# Patient Record
Sex: FEMALE | Race: WHITE | Employment: FULL TIME | URBAN - NONMETROPOLITAN AREA
[De-identification: names, ages, dates, MRNs, and addresses within clinical notes are randomized per-mention and may not be internally consistent; named-entity substitution may affect disease eponyms.]

---
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
## 2020-05-01 ENCOUNTER — HOSPITAL ENCOUNTER (EMERGENCY)
Age: 20
Discharge: HOME | End: 2020-05-01
Payer: COMMERCIAL

## 2020-05-01 VITALS
HEIGHT: 62 IN | BODY MASS INDEX: 28.52 KG/M2 | SYSTOLIC BLOOD PRESSURE: 91 MMHG | RESPIRATION RATE: 18 BRPM | TEMPERATURE: 98.6 F | DIASTOLIC BLOOD PRESSURE: 72 MMHG | OXYGEN SATURATION: 97 % | WEIGHT: 155 LBS | HEART RATE: 90 BPM

## 2020-05-01 PROCEDURE — 99284 EMERGENCY DEPT VISIT MOD MDM: CPT

## 2020-05-01 RX ORDER — ACETAMINOPHEN 325 MG/1
650 TABLET ORAL EVERY 6 HOURS PRN
Qty: 30 TABLET | Refills: 0 | Status: SHIPPED | OUTPATIENT
Start: 2020-05-01 | End: 2020-06-11 | Stop reason: ALTCHOICE

## 2020-05-01 RX ORDER — TIZANIDINE 4 MG/1
4 TABLET ORAL EVERY 8 HOURS PRN
Qty: 18 TABLET | Refills: 0 | Status: SHIPPED | OUTPATIENT
Start: 2020-05-01 | End: 2020-06-11 | Stop reason: ALTCHOICE

## 2020-05-01 ASSESSMENT — PAIN SCALES - GENERAL: PAINLEVEL_OUTOF10: 3
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
## 2020-05-15 ENCOUNTER — TELEPHONE (OUTPATIENT)
Dept: SURGERY | Age: 20
End: 2020-05-15

## 2020-06-11 ENCOUNTER — HOSPITAL ENCOUNTER (EMERGENCY)
Age: 20
Discharge: HOME | End: 2020-06-11
Payer: COMMERCIAL

## 2020-06-11 VITALS
HEART RATE: 98 BPM | BODY MASS INDEX: 28.52 KG/M2 | TEMPERATURE: 97.6 F | OXYGEN SATURATION: 97 % | SYSTOLIC BLOOD PRESSURE: 126 MMHG | DIASTOLIC BLOOD PRESSURE: 78 MMHG | HEIGHT: 62 IN | RESPIRATION RATE: 18 BRPM | WEIGHT: 155 LBS

## 2020-06-11 LAB
BILIRUBIN URINE: NEGATIVE
BLOOD, URINE: ABNORMAL
CHARACTER, URINE: CLEAR
COLOR: YELLOW
GLUCOSE URINE: NEGATIVE MG/DL
KETONES, URINE: NEGATIVE
LEUKOCYTE ESTERASE, URINE: ABNORMAL
NITRITE, URINE: NEGATIVE
PH UA: 6 (ref 5–9)
PROTEIN UA: 30 MG/DL
SPECIFIC GRAVITY UA: 1.02 (ref 1–1.03)
UROBILINOGEN, URINE: 0.2 EU/DL (ref 0.2–1)

## 2020-06-11 PROCEDURE — 6370000000 HC RX 637 (ALT 250 FOR IP): Performed by: NURSE PRACTITIONER

## 2020-06-11 PROCEDURE — 2500000003 HC RX 250 WO HCPCS: Performed by: NURSE PRACTITIONER

## 2020-06-11 PROCEDURE — 6360000002 HC RX W HCPCS: Performed by: NURSE PRACTITIONER

## 2020-06-11 PROCEDURE — 87491 CHLMYD TRACH DNA AMP PROBE: CPT

## 2020-06-11 PROCEDURE — 99213 OFFICE O/P EST LOW 20 MIN: CPT | Performed by: NURSE PRACTITIONER

## 2020-06-11 PROCEDURE — 87591 N.GONORRHOEAE DNA AMP PROB: CPT

## 2020-06-11 PROCEDURE — 99213 OFFICE O/P EST LOW 20 MIN: CPT

## 2020-06-11 PROCEDURE — 81003 URINALYSIS AUTO W/O SCOPE: CPT

## 2020-06-11 PROCEDURE — 96372 THER/PROPH/DIAG INJ SC/IM: CPT

## 2020-06-11 RX ORDER — AZITHROMYCIN 250 MG/1
1000 TABLET, FILM COATED ORAL ONCE
Status: COMPLETED | OUTPATIENT
Start: 2020-06-11 | End: 2020-06-11

## 2020-06-11 RX ADMIN — LIDOCAINE HYDROCHLORIDE 250 MG: 10 INJECTION, SOLUTION EPIDURAL; INFILTRATION; INTRACAUDAL; PERINEURAL at 08:52

## 2020-06-11 RX ADMIN — AZITHROMYCIN 1000 MG: 250 TABLET, FILM COATED ORAL at 08:55

## 2020-06-11 ASSESSMENT — ENCOUNTER SYMPTOMS
ABDOMINAL PAIN: 0
VOMITING: 0
NAUSEA: 0
SHORTNESS OF BREATH: 0

## 2020-06-11 NOTE — ED PROVIDER NOTES
[facility]  Urgent Care Encounter       CHIEF COMPLAINT       Chief Complaint   Patient presents with    Exposure to STD       Nurses Notes reviewed and I agree except as noted in the HPI. HISTORY OF PRESENT ILLNESS   Belia Ariza is a 23 y.o. female who presents for evaluation after exposure to chlamydia. Patient states that she has been sexually active over the past month with her new boyfriend. States that her boyfriend received a text roughly 1 week ago stating that his previous sexual partner has tested positive for chlamydia. Patient states that her boyfriend was tested and was positive for chlamydia. States that he was treated 2 days ago. Patient denies any symptoms at this time but is concerned that she has been exposed without protection. The history is provided by the patient. REVIEW OF SYSTEMS     Review of Systems   Constitutional: Negative for chills and fever. Respiratory: Negative for shortness of breath. Cardiovascular: Negative for chest pain. Gastrointestinal: Negative for abdominal pain, nausea and vomiting. Genitourinary: Negative for dysuria and vaginal discharge. Musculoskeletal: Negative for arthralgias and myalgias. Skin: Negative for rash. Neurological: Negative for headaches. PAST MEDICAL HISTORY         Diagnosis Date    Crohn's colitis ([facility] [address] 2016    Environmental allergies     Pilonidal cyst        SURGICALHISTORY     Patient  has a past surgical history that includes other surgical history (12/05/2017); Colonoscopy; and Pilonidal cyst excision (N/A, 12/5/2019).     CURRENT MEDICATIONS       Previous Medications    IBUPROFEN (ADVIL;MOTRIN) 600 MG TABLET    Take 600 mg by mouth every 6 hours as needed for Pain    INFLIXIMAB (REMICADE) 100 MG INJECTION    Infuse 5 mg/kg intravenously See Admin Instructions Every 3 months    LORATADINE-PSEUDOEPHEDRINE (CLARITIN-D 24HR)  MG PER EXTENDED RELEASE TABLET    Take 1 tablet Findings: No rash. Neurological:      Mental Status: She is alert and oriented to person, place, and time. Psychiatric:         Behavior: Behavior normal.         DIAGNOSTIC RESULTS     Labs:  Results for orders placed or performed during the hospital encounter of 06/11/20   Urinalysis   Result Value Ref Range    Glucose, Ur Negative NEGATIVE mg/dl    Bilirubin Urine Negative NEGATIVE    Ketones, Urine Negative NEGATIVE    Specific Gravity, UA 1.025 1.002 - 1.03    Blood, Urine Trace-intact NEGATIVE    pH, UA 6.00 5.0 - 9.0    Protein, UA 30 (A) NEGATIVE mg/dl    Urobilinogen, Urine 0.20 0.2 - 1.0 eu/dl    Nitrite, Urine Negative NEGATIVE    Leukocyte Esterase, Urine Small (A) NEGATIVE    Color, UA Yellow STRAW-YELL    Character, Urine Clear CLEAR-SL C       IMAGING:    No orders to display         EKG:  none    URGENT CARE COURSE:     Vitals:    06/11/20 0829   BP: 133/84   Pulse: 95   Resp: 20   Temp: 97.6 °F (36.4 °C)   TempSrc: Temporal   SpO2: 98%   Weight: 155 lb (70.3 kg)   Height: 5' 2\" (1.575 m)       Medications   cefTRIAXone (ROCEPHIN) 250 mg in lidocaine 1 % 1 mL IM Injection (has no administration in time range)   azithromycin (ZITHROMAX) tablet 1,000 mg (has no administration in time range)            PROCEDURES:  None    FINAL IMPRESSION      1. Exposure to STD          DISPOSITION/ PLAN       Discussed with the patient the plan to test for chlamydia and gonorrhea, however she will be treated at this time. Patient is advised to remain free of any sexual activity until notified of her results and that she should refrain from any sexual activity with her boyfriend until he has had a test of cure that shows he is negative. She is advised to follow-up on an outpatient basis as needed and is agreeable to plan as discussed.     PATIENT REFERRED TO:  DO Talita Charles [address] / [address] [address]      DISCHARGE MEDICATIONS:  New Prescriptions    No medications on file       Discontinued Medications    ACETAMINOPHEN (AMINOFEN) 325 MG TABLET    Take 2 tablets by mouth every 6 hours as needed for Pain    TIZANIDINE (ZANAFLEX) 4 MG TABLET    Take 1 tablet by mouth every 8 hours as needed (muscle spasm)       Current Discharge Medication List          LUANN Rapp CNP    (Please note that portions of this note were completed with a voice recognition program. Efforts were made to edit the dictations but occasionally words are mis-transcribed.)          LUANN Rapp CNP  06/11/20 2024

## 2020-06-12 LAB
CHLAMYDIA TRACHOMATIS BY RT-PCR: NOT DETECTED
CT/NG SOURCE: NORMAL
NEISSERIA GONORRHOEAE BY RT-PCR: NOT DETECTED

## 2020-08-26 ENCOUNTER — OFFICE VISIT (OUTPATIENT)
Dept: SURGERY | Age: 20
End: 2020-08-26
Payer: COMMERCIAL

## 2020-08-26 ENCOUNTER — HOSPITAL ENCOUNTER (EMERGENCY)
Age: 20
Discharge: HOME | End: 2020-08-26
Payer: COMMERCIAL

## 2020-08-26 VITALS
DIASTOLIC BLOOD PRESSURE: 74 MMHG | OXYGEN SATURATION: 98 % | HEIGHT: 62 IN | HEART RATE: 142 BPM | BODY MASS INDEX: 30.36 KG/M2 | SYSTOLIC BLOOD PRESSURE: 116 MMHG | WEIGHT: 165 LBS | RESPIRATION RATE: 16 BRPM | TEMPERATURE: 98.2 F

## 2020-08-26 VITALS
BODY MASS INDEX: 29.44 KG/M2 | HEART RATE: 125 BPM | RESPIRATION RATE: 16 BRPM | DIASTOLIC BLOOD PRESSURE: 78 MMHG | WEIGHT: 160 LBS | TEMPERATURE: 99.3 F | OXYGEN SATURATION: 99 % | SYSTOLIC BLOOD PRESSURE: 125 MMHG | HEIGHT: 62 IN

## 2020-08-26 LAB
GROUP A STREP CULTURE, REFLEX: NEGATIVE
REFLEX THROAT C + S: NORMAL

## 2020-08-26 PROCEDURE — 99213 OFFICE O/P EST LOW 20 MIN: CPT

## 2020-08-26 PROCEDURE — 87880 STREP A ASSAY W/OPTIC: CPT

## 2020-08-26 PROCEDURE — 99213 OFFICE O/P EST LOW 20 MIN: CPT | Performed by: SURGERY

## 2020-08-26 PROCEDURE — 87070 CULTURE OTHR SPECIMN AEROBIC: CPT

## 2020-08-26 PROCEDURE — 99213 OFFICE O/P EST LOW 20 MIN: CPT | Performed by: NURSE PRACTITIONER

## 2020-08-26 RX ORDER — SULFAMETHOXAZOLE AND TRIMETHOPRIM 800; 160 MG/1; MG/1
1 TABLET ORAL 2 TIMES DAILY
Qty: 14 TABLET | Refills: 2 | Status: SHIPPED | OUTPATIENT
Start: 2020-08-26 | End: 2020-09-02

## 2020-08-26 ASSESSMENT — ENCOUNTER SYMPTOMS
SORE THROAT: 1
VOMITING: 0
NAUSEA: 0
SHORTNESS OF BREATH: 0
COUGH: 0

## 2020-08-26 ASSESSMENT — PAIN DESCRIPTION - FREQUENCY: FREQUENCY: CONTINUOUS

## 2020-08-26 ASSESSMENT — PAIN DESCRIPTION - ONSET
ONSET: GRADUAL
ONSET_2: GRADUAL

## 2020-08-26 ASSESSMENT — PAIN DESCRIPTION - INTENSITY: RATING_2: 3

## 2020-08-26 ASSESSMENT — PAIN SCALES - GENERAL: PAINLEVEL_OUTOF10: 8

## 2020-08-26 ASSESSMENT — PAIN DESCRIPTION - PAIN TYPE: TYPE: ACUTE PAIN

## 2020-08-26 ASSESSMENT — PAIN DESCRIPTION - DESCRIPTORS
DESCRIPTORS_2: SORE
DESCRIPTORS: HEADACHE

## 2020-08-26 ASSESSMENT — PAIN DESCRIPTION - DURATION: DURATION_2: CONTINUOUS

## 2020-08-26 ASSESSMENT — PAIN DESCRIPTION - PROGRESSION
CLINICAL_PROGRESSION_2: NOT CHANGED
CLINICAL_PROGRESSION: GRADUALLY WORSENING

## 2020-08-26 ASSESSMENT — PAIN DESCRIPTION - LOCATION
LOCATION_2: THROAT
LOCATION: HEAD

## 2020-08-26 ASSESSMENT — PAIN - FUNCTIONAL ASSESSMENT: PAIN_FUNCTIONAL_ASSESSMENT: ACTIVITIES ARE NOT PREVENTED

## 2020-08-26 NOTE — ED NOTES
Pt co feeling chilled with a headache and sore throat and intermittent dizziness. Skin pink, warm, and dry. Respirations even and unlabored. Pt co higher than normal heart rate.        Jamison Perry RN  08/26/20 6956

## 2020-08-26 NOTE — ED NOTES
Patient understood instructions verbally,  Follow up with PCP with any concerns, ambulated self to lobby,stable condition.       Raisa Quinonez LPN  91/36/80 [id number]

## 2020-08-26 NOTE — ED PROVIDER NOTES
[facility]  Urgent Care Encounter       CHIEF COMPLAINT       Chief Complaint   Patient presents with    Headache    Pharyngitis    Chills       Nurses Notes reviewed and I agree except as noted in the HPI. HISTORY OF PRESENT ILLNESS   Ananda Hinton is a 21 y.o. female who presents for evaluation of headache, sore throat, chills and sweating that began today. Patient denies any cough or known sick exposures. States that she has not taken any medications today or actually checked her temperature at home. She does report a history of strep throat and is concerned that this could be the case. The history is provided by the patient. REVIEW OF SYSTEMS     Review of Systems   Constitutional: Positive for chills and diaphoresis. Negative for fever. HENT: Positive for sore throat. Negative for congestion. Respiratory: Negative for cough and shortness of breath. Cardiovascular: Negative for chest pain. Gastrointestinal: Negative for nausea and vomiting. Musculoskeletal: Negative for arthralgias and myalgias. Skin: Negative for rash. Allergic/Immunologic: Negative for environmental allergies. Neurological: Positive for headaches. PAST MEDICAL HISTORY         Diagnosis Date    Crohn's colitis ([facility] [address] 2016    Environmental allergies     Pilonidal cyst        SURGICALHISTORY     Patient  has a past surgical history that includes other surgical history (12/05/2017); Colonoscopy; and Pilonidal cyst excision (N/A, 12/5/2019).     CURRENT MEDICATIONS       Previous Medications    IBUPROFEN (ADVIL;MOTRIN) 600 MG TABLET    Take 600 mg by mouth every 6 hours as needed for Pain    INFLIXIMAB (REMICADE) 100 MG INJECTION    Infuse 5 mg/kg intravenously See Admin Instructions Every 3 months    LORATADINE-PSEUDOEPHEDRINE (CLARITIN-D 24HR)  MG PER EXTENDED RELEASE TABLET    Take 1 tablet by mouth daily    SULFAMETHOXAZOLE-TRIMETHOPRIM (BACTRIM DS) 800-160 MG PER respiratory distress. Breath sounds: Normal breath sounds. Musculoskeletal:      Right knee: She exhibits normal range of motion. Left knee: She exhibits normal range of motion. Lymphadenopathy:      Head:      Right side of head: Tonsillar adenopathy present. Left side of head: Tonsillar adenopathy present. Skin:     General: Skin is warm. Findings: No rash. Neurological:      Mental Status: She is alert and oriented to person, place, and time. Psychiatric:         Behavior: Behavior normal.         DIAGNOSTIC RESULTS     Labs:  Results for orders placed or performed during the hospital encounter of 08/26/20   Strep A culture, throat    Specimen: Throat   Result Value Ref Range    REFLEX THROAT C + S INDICATED    STREP A ANTIGEN   Result Value Ref Range    GROUP A STREP CULTURE, REFLEX Negative        IMAGING:    No orders to display         EKG: none      URGENT CARE COURSE:     Vitals:    08/26/20 1747 08/26/20 1817   BP: 125/78    Pulse: 144 125   Resp: 16    Temp: 99.3 °F (37.4 °C)    TempSrc: Oral    SpO2: 99%    Weight: 160 lb (72.6 kg)    Height: 5' 2\" (1.575 m)        Medications - No data to display         PROCEDURES:  None    FINAL IMPRESSION      1. Acute tonsillitis, unspecified etiology          DISPOSITION/ PLAN     Strep swab is negative at this time. Discussion with the patient revealed that she does have a prescription for Bactrim waiting at the pharmacy for her that was prescribed per Dr. Beauchamp Covert for a pilonidal cyst.  She is advised to get the Bactrim filled and begin taking this medication. Discussed with the patient that she does have an elevated heart rate, however she states that her heart rate is typically elevated. Discussed that it is likely higher at this time due to the low-grade fever and she is advised use over-the-counter Tylenol and ibuprofen at home. .  She is advised to present directly to the ER for any worsening symptoms and is agreeable to plan as discussed.       PATIENT REFERRED TO:  DO Talita Coon [address] / [address] [address]      DISCHARGE MEDICATIONS:  New Prescriptions    No medications on file       Discontinued Medications    No medications on file       Current Discharge Medication List          LUANN Cisse CNP    (Please note that portions of this note were completed with a voice recognition program. Efforts were made to edit the dictations but occasionally words are mis-transcribed.)          LUANN Cisse CNP  08/26/20 5282

## 2020-08-28 LAB — THROAT/NOSE CULTURE: NORMAL

## 2020-08-31 ASSESSMENT — ENCOUNTER SYMPTOMS
TROUBLE SWALLOWING: 0
ABDOMINAL DISTENTION: 0
PHOTOPHOBIA: 0
EYE DISCHARGE: 0
STRIDOR: 0
RECTAL PAIN: 0
SHORTNESS OF BREATH: 0
COUGH: 0
FACIAL SWELLING: 0
ABDOMINAL PAIN: 0
SORE THROAT: 0
DIARRHEA: 0
NAUSEA: 0
SINUS PRESSURE: 0
EYE ITCHING: 0
ANAL BLEEDING: 0
VOMITING: 0
VOICE CHANGE: 0
APNEA: 0
COLOR CHANGE: 0
EYE PAIN: 0
WHEEZING: 0
BLOOD IN STOOL: 0
RHINORRHEA: 0
ALLERGIC/IMMUNOLOGIC NEGATIVE: 1
BACK PAIN: 0
CHEST TIGHTNESS: 0
CHOKING: 0
EYE REDNESS: 0
CONSTIPATION: 0

## 2020-08-31 NOTE — PROGRESS NOTES
Subjective:      Patient ID: Sandie Alex is a 21 y.o. female. Chief Complaint   Patient presents with    Surgical Consult     Est patient-last seen 12/16/19-s/p I&D WITH WASHOUT PILONIDAL/LEFT MEDIAL BUTTOCK ABSCESS (3x4cm)-12/5/19 having pain and drainage     HPI  Alexa Lam is a 27-year-old female who presents for evaluation of pilonidal disease. She underwent I&D with washout of left medial buttock/pilonidal cystic disease back in December 2018. At that time she had improvement of acute disease but did not want to proceed later with excision. She states she had done well until recently. Over the last 4-5 days she has noticed tenderness in that same area. Area seems to be a little bit harder. No skin changes or signs of redness. No drainage. No history of trauma to the area. No significant bowel function change. No diarrhea or constipation. No hematochezia melena. No urinary complaints. No abdominal pain. No other skin or subcutaneous tissue lesions that she is aware of. Review of Systems   Constitutional: Negative for activity change, appetite change, chills, diaphoresis, fatigue, fever and unexpected weight change. HENT: Negative for congestion, dental problem, drooling, ear discharge, ear pain, facial swelling, hearing loss, mouth sores, nosebleeds, postnasal drip, rhinorrhea, sinus pressure, sneezing, sore throat, tinnitus, trouble swallowing and voice change. Eyes: Negative for photophobia, pain, discharge, redness, itching and visual disturbance. Respiratory: Negative for apnea, cough, choking, chest tightness, shortness of breath, wheezing and stridor. Cardiovascular: Negative for chest pain, palpitations and leg swelling. Gastrointestinal: Negative for abdominal distention, abdominal pain, anal bleeding, blood in stool, constipation, diarrhea, nausea, rectal pain and vomiting. Endocrine: Negative.     Genitourinary: Negative for decreased urine volume, difficulty urinating, dyspareunia, dysuria, enuresis, flank pain, frequency, genital sores, hematuria, menstrual problem, pelvic pain, urgency, vaginal bleeding, vaginal discharge and vaginal pain. Musculoskeletal: Negative for arthralgias, back pain, gait problem, joint swelling, myalgias, neck pain and neck stiffness. Skin: Negative for color change, pallor, rash and wound. Allergic/Immunologic: Negative. Neurological: Negative for dizziness, tremors, seizures, syncope, facial asymmetry, speech difficulty, weakness, light-headedness, numbness and headaches. Hematological: Negative for adenopathy. Does not bruise/bleed easily. Psychiatric/Behavioral: Negative for agitation, behavioral problems, confusion, decreased concentration, dysphoric mood, hallucinations, self-injury, sleep disturbance and suicidal ideas. The patient is not nervous/anxious and is not hyperactive. Past Medical History:   Diagnosis Date    Crohn's colitis ([facility] [address] 2016    Environmental allergies     Pilonidal cyst        Past Surgical History:   Procedure Laterality Date    COLONOSCOPY      OTHER SURGICAL HISTORY  12/05/2017    abdominal abscess drainage-radiology-[facility]    PILONIDAL CYST EXCISION N/A 12/5/2019    PILONIDAL CYSTECTOMY performed by Mark Gonzales MD at [facility]       Current Outpatient Medications   Medication Sig Dispense Refill    sulfamethoxazole-trimethoprim (BACTRIM DS) 800-160 MG per tablet Take 1 tablet by mouth 2 times daily for 7 days 14 tablet 2    ibuprofen (ADVIL;MOTRIN) 600 MG tablet Take 600 mg by mouth every 6 hours as needed for Pain      inFLIXimab (REMICADE) 100 MG injection Infuse 5 mg/kg intravenously See Admin Instructions Every 3 months      loratadine-pseudoephedrine (CLARITIN-D 24HR)  MG per extended release tablet Take 1 tablet by mouth daily 30 tablet 0     No current facility-administered medications for this visit.         Allergies   Allergen Reactions    Morphine Itching       Family abdominal tenderness    Cellulitis of buttock    Abdominal abscess    Abdominal pain, right lower quadrant    Abscess of buttock     Assessment:      1. Recurrent pilonidal cystic disease      Plan:      1. Antibiotics  2. No signs of abscess in need of formal I&D today. See how she does with the antibiotics. If resolves in its entirety then patient would like to continue with observation only. If disease continues to recur patient is in need of excision. Patient in agreement. 3.  Restrictions discussed with patient. All questions answered. 4.  Follow-up in 1 week  5. Signs and symptoms reviewed with patient that would be concerning and need her to return to office for re-evaluation. Patient states She will call if She has questions or concerns.         Daniel Rojo MD

## 2021-03-03 ENCOUNTER — HOSPITAL ENCOUNTER (EMERGENCY)
Age: 21
Discharge: HOME | End: 2021-03-03
Payer: COMMERCIAL

## 2021-03-03 VITALS
TEMPERATURE: 98 F | BODY MASS INDEX: 27.44 KG/M2 | DIASTOLIC BLOOD PRESSURE: 78 MMHG | OXYGEN SATURATION: 100 % | SYSTOLIC BLOOD PRESSURE: 110 MMHG | RESPIRATION RATE: 18 BRPM | WEIGHT: 150 LBS | HEART RATE: 116 BPM

## 2021-03-03 DIAGNOSIS — S60.512A ABRASION OF PALM OF LEFT HAND, INITIAL ENCOUNTER: ICD-10-CM

## 2021-03-03 DIAGNOSIS — S09.90XA MILD CLOSED HEAD INJURY, INITIAL ENCOUNTER: Primary | ICD-10-CM

## 2021-03-03 PROCEDURE — 99213 OFFICE O/P EST LOW 20 MIN: CPT

## 2021-03-03 PROCEDURE — 90715 TDAP VACCINE 7 YRS/> IM: CPT | Performed by: NURSE PRACTITIONER

## 2021-03-03 PROCEDURE — 99214 OFFICE O/P EST MOD 30 MIN: CPT | Performed by: NURSE PRACTITIONER

## 2021-03-03 PROCEDURE — 6360000002 HC RX W HCPCS: Performed by: NURSE PRACTITIONER

## 2021-03-03 PROCEDURE — 90471 IMMUNIZATION ADMIN: CPT | Performed by: NURSE PRACTITIONER

## 2021-03-03 RX ORDER — DIAPER,BRIEF,INFANT-TODD,DISP
EACH MISCELLANEOUS
Qty: 30 G | Refills: 0 | Status: SHIPPED | OUTPATIENT
Start: 2021-03-03 | End: 2021-03-13

## 2021-03-03 RX ADMIN — TETANUS TOXOID, REDUCED DIPHTHERIA TOXOID AND ACELLULAR PERTUSSIS VACCINE, ADSORBED 0.5 ML: 5; 2.5; 8; 8; 2.5 SUSPENSION INTRAMUSCULAR at 15:19

## 2021-03-03 ASSESSMENT — ENCOUNTER SYMPTOMS: PHOTOPHOBIA: 0

## 2021-03-03 ASSESSMENT — PAIN DESCRIPTION - LOCATION: LOCATION: HEAD

## 2021-03-03 NOTE — PROGRESS NOTES
Injection time completed. Patient tolerated well. No adverse reactions noted at this time. Vital signs remain within normal limits. Discharge instructions reviewed with patient. Patient verbalized understanding. Ambulated to private transportation at this time.

## 2021-03-03 NOTE — ED PROVIDER NOTES
[facility]       CHIEF COMPLAINT       Chief Complaint   Patient presents with    Head Injury     Pt was walking her dog today and it started chasing a squirrel and pulled her down. Pt has a lump on forehead and left hand has abrasions. Nurses Notes reviewed and I agree except as noted in the HPI. HISTORY OF PRESENT ILLNESS   Ronaldo Young is a 21 y.o. female who presents     Patient is present in the urgent care today with complaints of abrasions to left hand as well as bruise and abrasion to left upper frontal lobe. She states that she was walking her dog earlier today, when her dog had got loose from her causing her to fall forward and hitting head. She denies losing consciousness. Denies any visual changes. She denies any neck tenderness. Denies taking any anticoagulants. REVIEW OF SYSTEMS     Review of Systems   Constitutional: Negative for chills and fever. Eyes: Negative for photophobia and visual disturbance. Musculoskeletal: Negative for neck pain and neck stiffness. Skin: Negative for rash. Neurological: Positive for headaches (tenderness to contusion/ abrasion). Negative for dizziness, weakness, light-headedness and numbness. PAST MEDICAL HISTORY         Diagnosis Date    Crohn's colitis ([facility] [address] 2016    Environmental allergies     Pilonidal cyst        SURGICALHISTORY     Patient  has a past surgical history that includes other surgical history (12/05/2017); Colonoscopy; and Pilonidal cyst excision (N/A, 12/5/2019).     CURRENT MEDICATIONS       Discharge Medication List as of 3/3/2021  3:18 PM      CONTINUE these medications which have NOT CHANGED    Details   ibuprofen (ADVIL;MOTRIN) 600 MG tablet Take 600 mg by mouth every 6 hours as needed for PainHistorical Med      inFLIXimab (REMICADE) 100 MG injection Infuse 5 mg/kg intravenously See Admin Instructions Every 3 monthsHistorical Med      loratadine-pseudoephedrine (CLARITIN-D 24HR)  MG per extended release tablet Take 1 tablet by mouth daily, Disp-30 tablet, R-0Normal             ALLERGIES     Patient is is allergic to morphine. Patients   Immunization History   Administered Date(s) Administered    Tdap (Boostrix, Adacel) 03/03/2021       FAMILY HISTORY     Patient's family history includes Arthritis in her maternal grandfather; COPD in her maternal grandmother; Heart Disease in her maternal grandfather and paternal grandfather; High Blood Pressure in her mother; No Known Problems in her father; Stroke in her maternal grandfather; Substance Abuse in her maternal grandfather; Vision Loss in her maternal grandfather. SOCIAL HISTORY     Patient  reports that she has never smoked. She has never used smokeless tobacco. She reports that she does not drink alcohol or use drugs. PHYSICAL EXAM     ED TRIAGE VITALS  BP: 110/78, Temp: 98 °F (36.7 °C), Pulse: 116, Resp: 18, SpO2: 100 %,Estimated body mass index is 27.44 kg/m² as calculated from the following:    Height as of 8/26/20: 5' 2\" (1.575 m). Weight as of this encounter: 150 lb (68 kg). ,Patient's last menstrual period was 02/06/2021 (approximate). Physical Exam  Constitutional:       General: She is not in acute distress. Appearance: Normal appearance. She is well-developed. She is not ill-appearing, toxic-appearing or diaphoretic. Comments: Contusion, no crepitus or bleeding from site, abrasion noted to site as well. HENT:      Head: Contusion present. No raccoon eyes. Neck:      Musculoskeletal: Normal range of motion and neck supple. Pulmonary:      Effort: Pulmonary effort is normal. No respiratory distress. Musculoskeletal: Normal range of motion. Skin:     General: Skin is warm. Capillary Refill: Capillary refill takes less than 2 seconds. Findings: Abrasion (scattered throughout left hand) and bruising present. Neurological:      General: No focal deficit present. Mental Status: She is alert, oriented to person, place, and time and easily aroused. Motor: No weakness. Psychiatric:         Mood and Affect: Mood normal.         Behavior: Behavior normal. Behavior is cooperative. Thought Content: Thought content normal.         Judgment: Judgment normal.         DIAGNOSTIC RESULTS     Labs:No results found for this visit on 03/03/21. IMAGING:    No orders to display     URGENT CARE COURSE:     Vitals:    03/03/21 1456 03/03/21 1536   BP: 136/76 110/78   Pulse: 110 116   Resp: 18 18   Temp: 98 °F (36.7 °C)    TempSrc: Temporal    SpO2: 100% 100%   Weight: 150 lb (68 kg)        Medications   Tetanus-Diphth-Acell Pertussis (BOOSTRIX) injection 0.5 mL (0.5 mLs Intramuscular Given 3/3/21 1519)            PROCEDURES:  None    FINAL IMPRESSION      1. Mild closed head injury, initial encounter    2. Abrasion of palm of left hand, initial encounter          DISPOSITION/ PLAN   Patient is discharged home with prescription for bacitracin ointment. Patient did receive a tetanus vaccine while the urgent care. Discussed with patient at great length that there is possibility of concussion, therefore she should have significant other who lives with her monitor mentation for the next 24 hours. Over-the-counter Tylenol as well as ice to site can help with irritation. Discussed with patient given that fall was not from high altitude, or at great speed, or that patient is not on anticoagulants that there is low suspicion for intracranial bleed. If there is any changes to mentation patient should go directly to the ER.         PATIENT REFERRED TO:  DO Talita [address]ajo [address] [place] [facility] [id number]      DISCHARGE MEDICATIONS:  Discharge Medication List as of 3/3/2021  3:18 PM      START taking these medications    Details   bacitracin zinc 500 UNIT/GM ointment Apply topically 2 times daily, for 1 week, Disp-30 g, R-0, Print             Discharge Medication List as of 3/3/2021  3:18 PM          Discharge Medication List as of 3/3/2021  3:18 PM          LUANN Nuñez NP    (Please note that portions of this note were completed with a voice recognition program. Efforts were made to edit the dictations but occasionally words are mis-transcribed.)          LUANN Rushing NP  03/03/21 [id number]

## 2021-03-05 ENCOUNTER — APPOINTMENT (OUTPATIENT)
Dept: GENERAL RADIOLOGY | Age: 21
End: 2021-03-05
Payer: COMMERCIAL

## 2021-03-05 ENCOUNTER — HOSPITAL ENCOUNTER (EMERGENCY)
Age: 21
Discharge: HOME | End: 2021-03-06
Attending: EMERGENCY MEDICINE
Payer: COMMERCIAL

## 2021-03-05 VITALS
RESPIRATION RATE: 25 BRPM | OXYGEN SATURATION: 100 % | BODY MASS INDEX: 27.6 KG/M2 | HEART RATE: 94 BPM | TEMPERATURE: 97.8 F | HEIGHT: 62 IN | SYSTOLIC BLOOD PRESSURE: 113 MMHG | WEIGHT: 150 LBS | DIASTOLIC BLOOD PRESSURE: 73 MMHG

## 2021-03-05 DIAGNOSIS — R06.02 SHORTNESS OF BREATH: Primary | ICD-10-CM

## 2021-03-05 DIAGNOSIS — R07.89 ATYPICAL CHEST PAIN: ICD-10-CM

## 2021-03-05 LAB
ANION GAP SERPL CALCULATED.3IONS-SCNC: 9 MEQ/L (ref 8–16)
BASOPHILS # BLD: 0.2 %
BASOPHILS ABSOLUTE: 0 THOU/MM3 (ref 0–0.1)
BUN BLDV-MCNC: 16 MG/DL (ref 7–22)
CALCIUM SERPL-MCNC: 9 MG/DL (ref 8.5–10.5)
CHLORIDE BLD-SCNC: 101 MEQ/L (ref 98–111)
CO2: 25 MEQ/L (ref 23–33)
CREAT SERPL-MCNC: 0.6 MG/DL (ref 0.4–1.2)
EKG ATRIAL RATE: 116 BPM
EKG P AXIS: 40 DEGREES
EKG P-R INTERVAL: 132 MS
EKG Q-T INTERVAL: 322 MS
EKG QRS DURATION: 94 MS
EKG QTC CALCULATION (BAZETT): 447 MS
EKG R AXIS: 61 DEGREES
EKG T AXIS: 32 DEGREES
EKG VENTRICULAR RATE: 116 BPM
EOSINOPHIL # BLD: 2.5 %
EOSINOPHILS ABSOLUTE: 0.2 THOU/MM3 (ref 0–0.4)
ERYTHROCYTE [DISTWIDTH] IN BLOOD BY AUTOMATED COUNT: 14.9 % (ref 11.5–14.5)
ERYTHROCYTE [DISTWIDTH] IN BLOOD BY AUTOMATED COUNT: 44.7 FL (ref 35–45)
GFR SERPL CREATININE-BSD FRML MDRD: > 90 ML/MIN/1.73M2
GLUCOSE BLD-MCNC: 94 MG/DL (ref 70–108)
HCT VFR BLD CALC: 35.7 % (ref 37–47)
HEMOGLOBIN: 10.9 GM/DL (ref 12–16)
IMMATURE GRANS (ABS): 0.02 THOU/MM3 (ref 0–0.07)
IMMATURE GRANULOCYTES: 0.2 %
LYMPHOCYTES # BLD: 28 %
LYMPHOCYTES ABSOLUTE: 2.5 THOU/MM3 (ref 1–4.8)
MCH RBC QN AUTO: 25.4 PG (ref 26–33)
MCHC RBC AUTO-ENTMCNC: 30.5 GM/DL (ref 32.2–35.5)
MCV RBC AUTO: 83.2 FL (ref 81–99)
MONOCYTES # BLD: 9.8 %
MONOCYTES ABSOLUTE: 0.9 THOU/MM3 (ref 0.4–1.3)
NUCLEATED RED BLOOD CELLS: 0 /100 WBC
OSMOLALITY CALCULATION: 271 MOSMOL/KG (ref 275–300)
PLATELET # BLD: 270 THOU/MM3 (ref 130–400)
PMV BLD AUTO: 9.1 FL (ref 9.4–12.4)
POTASSIUM SERPL-SCNC: 3.7 MEQ/L (ref 3.5–5.2)
RBC # BLD: 4.29 MILL/MM3 (ref 4.2–5.4)
SEG NEUTROPHILS: 59.3 %
SEGMENTED NEUTROPHILS ABSOLUTE COUNT: 5.3 THOU/MM3 (ref 1.8–7.7)
SODIUM BLD-SCNC: 135 MEQ/L (ref 135–145)
WBC # BLD: 9 THOU/MM3 (ref 4.8–10.8)

## 2021-03-05 PROCEDURE — 84443 ASSAY THYROID STIM HORMONE: CPT

## 2021-03-05 PROCEDURE — 99284 EMERGENCY DEPT VISIT MOD MDM: CPT

## 2021-03-05 PROCEDURE — 84484 ASSAY OF TROPONIN QUANT: CPT

## 2021-03-05 PROCEDURE — 93005 ELECTROCARDIOGRAM TRACING: CPT | Performed by: EMERGENCY MEDICINE

## 2021-03-05 PROCEDURE — 71045 X-RAY EXAM CHEST 1 VIEW: CPT

## 2021-03-05 PROCEDURE — 85025 COMPLETE CBC W/AUTO DIFF WBC: CPT

## 2021-03-05 PROCEDURE — 80048 BASIC METABOLIC PNL TOTAL CA: CPT

## 2021-03-05 PROCEDURE — 85379 FIBRIN DEGRADATION QUANT: CPT

## 2021-03-05 PROCEDURE — 84703 CHORIONIC GONADOTROPIN ASSAY: CPT

## 2021-03-05 PROCEDURE — 36415 COLL VENOUS BLD VENIPUNCTURE: CPT

## 2021-03-05 ASSESSMENT — PAIN SCALES - GENERAL: PAINLEVEL_OUTOF10: 4

## 2021-03-06 LAB
D-DIMER QUANTITATIVE: 303 NG/ML FEU (ref 0–500)
PREGNANCY, SERUM: NEGATIVE
TROPONIN T: < 0.01 NG/ML
TSH SERPL DL<=0.05 MIU/L-ACNC: 3 UIU/ML (ref 0.4–4.2)

## 2021-03-06 ASSESSMENT — ENCOUNTER SYMPTOMS
COUGH: 0
EYE PAIN: 0
RHINORRHEA: 0
WHEEZING: 0
SINUS PAIN: 0
DIARRHEA: 0
CHEST TIGHTNESS: 0
ABDOMINAL PAIN: 0
EYE DISCHARGE: 0
EYE REDNESS: 0
BACK PAIN: 0
SHORTNESS OF BREATH: 1
CONSTIPATION: 0
COLOR CHANGE: 0
VOMITING: 0
NAUSEA: 0
SINUS PRESSURE: 0
SORE THROAT: 0

## 2021-03-06 NOTE — ED NOTES
Pt resting quietly in room no needs expressed. Side rails up x2 with call light in reach. Will continue to monitor.        Andrew Boogie RN  03/05/21 2034

## 2021-03-06 NOTE — ED PROVIDER NOTES
Immature Granulocytes 0.2 %    Segs Absolute 5.3 1 - 7 thou/mm3    Lymphocytes Absolute 2.5 1.0 - 4.8 thou/mm3    Monocytes Absolute 0.9 0.4 - 1.3 thou/mm3    Eosinophils Absolute 0.2 0.0 - 0.4 thou/mm3    Basophils Absolute 0.0 0.0 - 0.1 thou/mm3    Immature Grans (Abs) 0.02 0.00 - 0.07 thou/mm3    nRBC 0 /100 wbc   D-dimer, quantitative   Result Value Ref Range    D-Dimer, Quant 303.00 0.00 - 500.00 ng/ml FEU   TSH with Reflex   Result Value Ref Range    TSH 3.000 0.400 - 4.200 uIU/mL   Troponin   Result Value Ref Range    Troponin T < 0.010 ng/ml   HCG Qualitative, Serum   Result Value Ref Range    Preg, Serum NEGATIVE NEGATIVE   Anion Gap   Result Value Ref Range    Anion Gap 9.0 8.0 - 16.0 meq/L   Glomerular Filtration Rate, Estimated   Result Value Ref Range    Est, Glom Filt Rate >90 ml/min/1.73m2   Osmolality   Result Value Ref Range    Osmolality Calc 271.0 (L) 275.0 - 300.0 mOsmol/kg   EKG SOB   Result Value Ref Range    Ventricular Rate 116 BPM    Atrial Rate 116 BPM    P-R Interval 132 ms    QRS Duration 94 ms    Q-T Interval 322 ms    QTc Calculation (Bazett) 447 ms    P Axis 40 degrees    R Axis 61 degrees    T Axis 32 degrees       IMAGING STUDIES  XR CHEST PORTABLE   Final Result   1. No acute findings. This document has been electronically signed by: Rajwinder Calderon MD on 03/05/2021 11:07 PM          ED MEDICATIONS  Medications - No data to display    DIAGNOSIS  1. Shortness of breath    2.  Atypical chest pain        DISPOSITION and NYDIA Bell MD  03/06/21 6653
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
## 2021-03-06 NOTE — ED TRIAGE NOTES
Pt presents to the ED via ED lobby with c/o intermittent SOB for the past few days. Upon arrival pt tachycardic on monitor. VSS, respirations even and unlabored. EKG complete.

## 2021-04-17 ENCOUNTER — HOSPITAL ENCOUNTER (EMERGENCY)
Age: 21
Discharge: HOME | End: 2021-04-17
Payer: COMMERCIAL

## 2021-04-17 VITALS
BODY MASS INDEX: 29.44 KG/M2 | DIASTOLIC BLOOD PRESSURE: 54 MMHG | HEIGHT: 62 IN | RESPIRATION RATE: 16 BRPM | WEIGHT: 160 LBS | TEMPERATURE: 97 F | HEART RATE: 90 BPM | OXYGEN SATURATION: 99 % | SYSTOLIC BLOOD PRESSURE: 97 MMHG

## 2021-04-17 DIAGNOSIS — N94.6 DYSMENORRHEA: Primary | ICD-10-CM

## 2021-04-17 LAB
ABO: NORMAL
ANION GAP SERPL CALCULATED.3IONS-SCNC: 11 MEQ/L (ref 8–16)
ANTIBODY SCREEN: NORMAL
BACTERIA: ABNORMAL /HPF
BASOPHILS # BLD: 0.2 %
BASOPHILS ABSOLUTE: 0 THOU/MM3 (ref 0–0.1)
BILIRUBIN URINE: NEGATIVE
BLOOD, URINE: ABNORMAL
BUN BLDV-MCNC: 11 MG/DL (ref 7–22)
CALCIUM SERPL-MCNC: 8.9 MG/DL (ref 8.5–10.5)
CASTS 2: ABNORMAL /LPF
CASTS UA: ABNORMAL /LPF
CHARACTER, URINE: CLEAR
CHLORIDE BLD-SCNC: 101 MEQ/L (ref 98–111)
CO2: 24 MEQ/L (ref 23–33)
COLOR: YELLOW
CREAT SERPL-MCNC: 0.5 MG/DL (ref 0.4–1.2)
CRYSTALS, UA: ABNORMAL
EOSINOPHIL # BLD: 1.9 %
EOSINOPHILS ABSOLUTE: 0.2 THOU/MM3 (ref 0–0.4)
EPITHELIAL CELLS, UA: ABNORMAL /HPF
ERYTHROCYTE [DISTWIDTH] IN BLOOD BY AUTOMATED COUNT: 15.9 % (ref 11.5–14.5)
ERYTHROCYTE [DISTWIDTH] IN BLOOD BY AUTOMATED COUNT: 48.5 FL (ref 35–45)
GFR SERPL CREATININE-BSD FRML MDRD: > 90 ML/MIN/1.73M2
GLUCOSE BLD-MCNC: 100 MG/DL (ref 70–108)
GLUCOSE BLD-MCNC: 81 MG/DL (ref 70–108)
GLUCOSE URINE: NEGATIVE MG/DL
HCT VFR BLD CALC: 38.7 % (ref 37–47)
HEMOGLOBIN: 11.8 GM/DL (ref 12–16)
IMMATURE GRANS (ABS): 0.03 THOU/MM3 (ref 0–0.07)
IMMATURE GRANULOCYTES: 0.3 %
KETONES, URINE: NEGATIVE
LEUKOCYTE ESTERASE, URINE: NEGATIVE
LYMPHOCYTES # BLD: 21.6 %
LYMPHOCYTES ABSOLUTE: 1.9 THOU/MM3 (ref 1–4.8)
MCH RBC QN AUTO: 25.7 PG (ref 26–33)
MCHC RBC AUTO-ENTMCNC: 30.5 GM/DL (ref 32.2–35.5)
MCV RBC AUTO: 84.1 FL (ref 81–99)
MISCELLANEOUS 2: ABNORMAL
MONOCYTES # BLD: 7.4 %
MONOCYTES ABSOLUTE: 0.7 THOU/MM3 (ref 0.4–1.3)
NITRITE, URINE: NEGATIVE
NUCLEATED RED BLOOD CELLS: 0 /100 WBC
OSMOLALITY CALCULATION: 270.4 MOSMOL/KG (ref 275–300)
PH UA: 6.5 (ref 5–9)
PLATELET # BLD: 233 THOU/MM3 (ref 130–400)
PMV BLD AUTO: 8.7 FL (ref 9.4–12.4)
POTASSIUM SERPL-SCNC: 3.7 MEQ/L (ref 3.5–5.2)
PREGNANCY, SERUM: NEGATIVE
PROTEIN UA: NEGATIVE
RBC # BLD: 4.6 MILL/MM3 (ref 4.2–5.4)
RBC URINE: ABNORMAL /HPF
RENAL EPITHELIAL, UA: ABNORMAL
RH FACTOR: NORMAL
SEG NEUTROPHILS: 68.6 %
SEGMENTED NEUTROPHILS ABSOLUTE COUNT: 6 THOU/MM3 (ref 1.8–7.7)
SODIUM BLD-SCNC: 136 MEQ/L (ref 135–145)
SPECIFIC GRAVITY, URINE: 1.01 (ref 1–1.03)
UROBILINOGEN, URINE: 0.2 EU/DL (ref 0–1)
WBC # BLD: 8.8 THOU/MM3 (ref 4.8–10.8)
WBC UA: ABNORMAL /HPF
YEAST: ABNORMAL

## 2021-04-17 PROCEDURE — 86901 BLOOD TYPING SEROLOGIC RH(D): CPT

## 2021-04-17 PROCEDURE — 84703 CHORIONIC GONADOTROPIN ASSAY: CPT

## 2021-04-17 PROCEDURE — 6360000002 HC RX W HCPCS: Performed by: NURSE PRACTITIONER

## 2021-04-17 PROCEDURE — 2580000003 HC RX 258: Performed by: NURSE PRACTITIONER

## 2021-04-17 PROCEDURE — 81001 URINALYSIS AUTO W/SCOPE: CPT

## 2021-04-17 PROCEDURE — 80048 BASIC METABOLIC PNL TOTAL CA: CPT

## 2021-04-17 PROCEDURE — 82948 REAGENT STRIP/BLOOD GLUCOSE: CPT

## 2021-04-17 PROCEDURE — 85025 COMPLETE CBC W/AUTO DIFF WBC: CPT

## 2021-04-17 PROCEDURE — 36415 COLL VENOUS BLD VENIPUNCTURE: CPT

## 2021-04-17 PROCEDURE — 86850 RBC ANTIBODY SCREEN: CPT

## 2021-04-17 PROCEDURE — 96374 THER/PROPH/DIAG INJ IV PUSH: CPT

## 2021-04-17 PROCEDURE — 99215 OFFICE O/P EST HI 40 MIN: CPT

## 2021-04-17 PROCEDURE — 86900 BLOOD TYPING SEROLOGIC ABO: CPT

## 2021-04-17 PROCEDURE — 99283 EMERGENCY DEPT VISIT LOW MDM: CPT

## 2021-04-17 RX ORDER — KETOROLAC TROMETHAMINE 30 MG/ML
30 INJECTION, SOLUTION INTRAMUSCULAR; INTRAVENOUS ONCE
Status: COMPLETED | OUTPATIENT
Start: 2021-04-17 | End: 2021-04-17

## 2021-04-17 RX ORDER — KETOROLAC TROMETHAMINE 10 MG/1
10 TABLET, FILM COATED ORAL EVERY 6 HOURS PRN
Qty: 20 TABLET | Refills: 0 | Status: SHIPPED | OUTPATIENT
Start: 2021-04-17

## 2021-04-17 RX ORDER — 0.9 % SODIUM CHLORIDE 0.9 %
1000 INTRAVENOUS SOLUTION INTRAVENOUS ONCE
Status: COMPLETED | OUTPATIENT
Start: 2021-04-17 | End: 2021-04-17

## 2021-04-17 RX ADMIN — SODIUM CHLORIDE 1000 ML: 9 INJECTION, SOLUTION INTRAVENOUS at 18:26

## 2021-04-17 RX ADMIN — KETOROLAC TROMETHAMINE 30 MG: 30 INJECTION, SOLUTION INTRAMUSCULAR at 18:26

## 2021-04-17 ASSESSMENT — ENCOUNTER SYMPTOMS
DIARRHEA: 1
DIARRHEA: 0
NAUSEA: 0
SHORTNESS OF BREATH: 0
BLOOD IN STOOL: 0
RHINORRHEA: 0
ABDOMINAL DISTENTION: 0
VOMITING: 0
CHEST TIGHTNESS: 0
TROUBLE SWALLOWING: 0
ABDOMINAL PAIN: 1
COLOR CHANGE: 0
ANAL BLEEDING: 0
SORE THROAT: 0
BACK PAIN: 0

## 2021-04-17 ASSESSMENT — PAIN SCALES - GENERAL: PAINLEVEL_OUTOF10: 5

## 2021-04-17 ASSESSMENT — PAIN DESCRIPTION - LOCATION
LOCATION: ABDOMEN
LOCATION: ABDOMEN

## 2021-04-17 ASSESSMENT — PAIN DESCRIPTION - DESCRIPTORS: DESCRIPTORS: CRAMPING

## 2021-04-17 ASSESSMENT — PAIN DESCRIPTION - PAIN TYPE: TYPE: ACUTE PAIN

## 2021-04-17 NOTE — ED PROVIDER NOTES
[facility] [address] [place] [address]       Chief Complaint   Patient presents with    Abdominal Pain     cramping and vaginal bleeding    Dizziness     light headed    Other     feels shaking       Nurses Notes reviewed and I agree except as noted in the HPI. HISTORY OF PRESENT ILLNESS    Lola Young ilan 21 y.o. female who presents to the ED for evaluation of abdominal cramping. Patient states her menstrual period started on Tuesday, she has had lower abdominal cramping since then. Over the last 24 hours it increased significantly. She has been taking ibuprofen and Tylenol with no improvement. She notes she felt dizzy today when she came home from work. Felt she was going to pass out. She has been going through about 5 tampons a day. She notes her pain is currently a 6 out of 10. She notes she had an IUD placed about 1 year ago and her periods have gotten worse since then. She notes to have a copper IUD. She notes chronic diarrhea associated with Crohn's disease. She takes Remicade for this. She denies any change in urination denies any nausea or vomiting. Denies any chest pain or shortness of breath. HPI was provided by the patient. REVIEW OF SYSTEMS     Review of Systems   Constitutional: Negative for activity change, appetite change and chills. Hot flashes   HENT: Negative for congestion, rhinorrhea, sore throat and trouble swallowing. Respiratory: Negative for chest tightness and shortness of breath. Cardiovascular: Negative for chest pain. Gastrointestinal: Positive for abdominal pain and diarrhea. Negative for abdominal distention, anal bleeding, blood in stool, nausea and vomiting. Genitourinary: Positive for menstrual problem and vaginal bleeding. Negative for decreased urine volume, difficulty urinating, dysuria, flank pain, vaginal discharge and vaginal pain. Musculoskeletal: Negative for back pain and myalgias.    Skin: Negative for color on file    Number of children: Not on file    Years of education: Not on file    Highest education level: Not on file   Occupational History    Not on file   Social Needs    Financial resource strain: Not on file    Food insecurity     Worry: Not on file     Inability: Not on file    Transportation needs     Medical: Not on file     Non-medical: Not on file   Tobacco Use    Smoking status: Never Smoker    Smokeless tobacco: Never Used   Substance and Sexual Activity    Alcohol use: No    Drug use: No    Sexual activity: Not on file   Lifestyle    Physical activity     Days per week: Not on file     Minutes per session: Not on file    Stress: Not on file   Relationships    Social connections     Talks on phone: Not on file     Gets together: Not on file     Attends Rastafari service: Not on file     Active member of club or organization: Not on file     Attends meetings of clubs or organizations: Not on file     Relationship status: Not on file    Intimate partner violence     Fear of current or ex partner: Not on file     Emotionally abused: Not on file     Physically abused: Not on file     Forced sexual activity: Not on file   Other Topics Concern    Not on file   Social History Narrative    Not on file       PHYSICAL EXAM     INITIAL VITALS:  height is 5' 2\" (1.575 m) and weight is 160 lb (72.6 kg). Her temporal temperature is 97 °F (36.1 °C). Her blood pressure is 97/54 (abnormal) and her pulse is 90. Her respiration is 16 and oxygen saturation is 99%. Physical Exam  Vitals signs and nursing note reviewed. Constitutional:       Appearance: She is well-developed and normal weight. HENT:      Head: Normocephalic. Cardiovascular:      Rate and Rhythm: Tachycardia present. Heart sounds: Normal heart sounds. Pulmonary:      Effort: Pulmonary effort is normal.      Breath sounds: Normal breath sounds. Abdominal:      General: Abdomen is flat.  Bowel sounds are normal. Palpations: Abdomen is soft. Tenderness: There is no abdominal tenderness. There is no right CVA tenderness, left CVA tenderness or guarding. Skin:     General: Skin is warm and dry. Capillary Refill: Capillary refill takes less than 2 seconds. Neurological:      Mental Status: She is alert. Psychiatric:         Mood and Affect: Mood normal.         Behavior: Behavior normal.         DIFFERENTIAL DIAGNOSIS:   Dysmenorrhea, anemia, electrolyte abnormality, dehydration, endometriosis  DIAGNOSTIC RESULTS       RADIOLOGY: non-plainfilm images(s) such as CT, Ultrasound and MRI are read by the radiologist.  Plain radiographic images are visualized and preliminarily interpreted by the emergency physician unless otherwise stated below.   No orders to display         LABS:   Labs Reviewed   CBC WITH AUTO DIFFERENTIAL - Abnormal; Notable for the following components:       Result Value    Hemoglobin 11.8 (*)     MCH 25.7 (*)     MCHC 30.5 (*)     RDW-CV 15.9 (*)     RDW-SD 48.5 (*)     MPV 8.7 (*)     All other components within normal limits   URINE WITH REFLEXED MICRO - Abnormal; Notable for the following components:    Blood, Urine MODERATE (*)     All other components within normal limits   OSMOLALITY - Abnormal; Notable for the following components:    Osmolality Calc 270.4 (*)     All other components within normal limits   BASIC METABOLIC PANEL   HCG, SERUM, QUALITATIVE   ANION GAP   GLOMERULAR FILTRATION RATE, ESTIMATED   POCT GLUCOSE   TYPE AND SCREEN       EMERGENCY DEPARTMENT COURSE:   Vitals:    Vitals:    04/17/21 1656 04/17/21 1827 04/17/21 1854 04/17/21 1951   BP: (!) 151/73 134/75 114/65 (!) 97/54   Pulse: 118 104 90 90   Resp: 16 18 16 16   Temp: 97 °F (36.1 °C)      TempSrc: Temporal      SpO2: 100% 99% 100% 99%   Weight: 160 lb (72.6 kg)      Height: 5' 2\" (1.575 m)        MDM    Patient was seen and evaluated in the emergency department, patient appeared to be in no acute distress, vital 7922

## 2021-04-17 NOTE — ED NOTES
Upon first contact with patient this RN receives bedside shift report from Israel Gupta RN. Pt resting on cot with unlabored respirations.  Pt denies any needs       723 Catarino Hammonds RN  04/17/21 1932

## 2021-04-17 NOTE — PROGRESS NOTES
Patient released in stable condition. Instructed to go directly to [facility] emergency department for further evaluation and treatment. Instructed to remain NPO until seen by emergency room provider. Patient verbalized understanding. Ambulated, per self, to private car.

## 2021-04-17 NOTE — ED NOTES
ED nurse-to-nurse bedside report    Chief Complaint   Patient presents with    Abdominal Pain     cramping and vaginal bleeding    Dizziness     light headed    Other     feels shaking      LOC: alert and orientated to name, place, date  Vital signs   Vitals:    04/17/21 1656 04/17/21 1827 04/17/21 1854   BP: (!) 151/73 134/75 114/65   Pulse: 118 104 90   Resp: 16 18 16   Temp: 97 °F (36.1 °C)     TempSrc: Temporal     SpO2: 100% 99% 100%   Weight: 160 lb (72.6 kg)     Height: 5' 2\" (1.575 m)        Pain:    Pain Interventions: see mar  Pain Goal: tolerable  Oxygen: No    Current needs required room air WNL   Telemetry: No  LDAs:   Peripheral IV 04/17/21 Right Antecubital (Active)   Site Assessment Clean;Dry; Intact 04/17/21 1826   Line Status Blood return noted; Flushed; Infusing 04/17/21 1826   Dressing Status Clean;Dry; Intact 04/17/21 1826   Dressing Intervention New 04/17/21 1826     Continuous Infusions:   Mobility: Independent  Ja[place]gui Fall Risk Score: No flowsheet data found. Fall Interventions: both side rails up with call light in reach.    Report given to: JOSE Miranda RN  04/17/21 1911

## 2021-04-17 NOTE — ED PROVIDER NOTES
[address] [facility] [address] [address] EMERGENCY DEPT  [facility]       Chief Complaint   Patient presents with    Abdominal Pain     cramping and vaginal bleeding    Dizziness     light headed    Other     feels shaking       Nurses Notes reviewed and I agree except as noted in the HPI. HISTORY OF PRESENT ILLNESS   Blair Young is a 21 y.o. female who presents for evaluation of abdominal cramping and pain, feeling lightheaded, and shaky for the last 3 days. Patient states that she is currently on her menstrual cycle, and it is normal when compared to those of the last several months. Patient states that her pain at the present time is 5/10 but when it hits her pain is a 10/10 which causes her to double over. She has been taking ibuprofen and Tylenol but they only help for a short time and the pain does not go away. Patient has a nonhormonal IUD in place for almost 1 year now. Past medical history of Crohn's disease with abdominal abscess. REVIEW OF SYSTEMS     Review of Systems   Constitutional: Negative for chills and fever. Respiratory: Negative for chest tightness and shortness of breath. Cardiovascular: Negative for chest pain. Gastrointestinal: Positive for abdominal pain. Negative for diarrhea, nausea and vomiting. Genitourinary: Negative for dysuria, frequency, hematuria and urgency. Skin: Negative for rash. Allergic/Immunologic: Negative for environmental allergies and food allergies. Neurological: Positive for dizziness. Negative for headaches. PAST MEDICAL HISTORY         Diagnosis Date    Crohn's colitis ([facility] [address] 2016    Environmental allergies     Pilonidal cyst        SURGICAL HISTORY     Patient  has a past surgical history that includes other surgical history (12/05/2017); Colonoscopy; and Pilonidal cyst excision (N/A, 12/5/2019).     CURRENT MEDICATIONS       Previous Medications    IBUPROFEN (ADVIL;MOTRIN) 600 MG TABLET    Take 600 mg by mouth every 6 hours as needed for Pain    INFLIXIMAB (REMICADE) 100 MG INJECTION    Infuse 5 mg/kg intravenously See Admin Instructions Every 3 months    LORATADINE-PSEUDOEPHEDRINE (CLARITIN-D 24HR)  MG PER EXTENDED RELEASE TABLET    Take 1 tablet by mouth daily       ALLERGIES     Patient is is allergic to morphine. FAMILY HISTORY     Patient'sfamily history includes Arthritis in her maternal grandfather; COPD in her maternal grandmother; Heart Disease in her maternal grandfather and paternal grandfather; High Blood Pressure in her mother; No Known Problems in her father; Stroke in her maternal grandfather; Substance Abuse in her maternal grandfather; Vision Loss in her maternal grandfather. SOCIAL HISTORY     Patient  reports that she has never smoked. She has never used smokeless tobacco. She reports that she does not drink alcohol or use drugs. PHYSICAL EXAM     ED TRIAGE VITALS  BP: (!) 151/73, Temp: 97 °F (36.1 °C), Pulse: 118, Resp: 16, SpO2: 100 %  Physical Exam  Vitals signs and nursing note reviewed. Constitutional:       General: She is not in acute distress. Appearance: Normal appearance. She is well-developed. HENT:      Head: Normocephalic and atraumatic. Right Ear: External ear normal.      Left Ear: External ear normal.      Mouth/Throat:      Lips: Pink. Mouth: Mucous membranes are moist.   Eyes:      Conjunctiva/sclera: Conjunctivae normal.      Right eye: Right conjunctiva is not injected. Left eye: Left conjunctiva is not injected. Pupils: Pupils are equal.   Neck:      Musculoskeletal: Normal range of motion. Cardiovascular:      Rate and Rhythm: Normal rate. Heart sounds: Normal heart sounds. Pulmonary:      Effort: Pulmonary effort is normal.      Breath sounds: Normal breath sounds. Abdominal:      General: Abdomen is flat. Bowel sounds are normal.      Palpations: Abdomen is soft. Tenderness:  There is abdominal tenderness in the right lower quadrant, periumbilical area, suprapubic area and left lower quadrant. Skin:     General: Skin is warm and dry. Findings: No rash (on exposed surfaces). Neurological:      Mental Status: She is alert and oriented to person, place, and time. Psychiatric:         Mood and Affect: Mood normal.         Speech: Speech normal.         Behavior: Behavior is cooperative. DIAGNOSTIC RESULTS   Labs:  Abnormal Labs Reviewed - No abnormal labs to display     IMAGING:  No orders to display     URGENT CARE COURSE:     Vitals:    04/17/21 1656   BP: (!) 151/73   Pulse: 118   Resp: 16   Temp: 97 °F (36.1 °C)   TempSrc: Temporal   SpO2: 100%   Weight: 160 lb (72.6 kg)   Height: 5' 2\" (1.575 m)       Medications - No data to display  PROCEDURES:  FINALIMPRESSION      1. Abdominal pain, unspecified abdominal location        DISPOSITION/PLAN   DISPOSITION    Transfer   After reviewing the patients History of Present illness, Vital Signs,Clinical Findings,Comorbities, and Clinical Data obtained I discussed with the patient or patient representative that there is a very real potential for serious injury / illness and the patient will need to be transfer to a level of higher care for further evaluation and continued care. It was explained that this would provide the best care for the patient. The patient/patient representative are agreeable to the treatment plan and are agreeable to be transferred therefore, the patient will be transferred to [facility] ED for reevaluation and further management of abdominal pain.              Problem List Items Addressed This Visit     None      Visit Diagnoses     Abdominal pain, unspecified abdominal location    -  Primary          PATIENT REFERRED TO:  [facility], EMERGENCY DEPT  [address]      [address] TO [address] [place] [address], for further evalation      Arturo Mojica, [address] Johnson,Rommel B, APRN - CNP  04/17/21 2744

## 2021-04-17 NOTE — LETTER
[address] [facility] [address] [address] EMERGENCY DEPT  [address] [place] [id number]  Phone: [phone]               April 17, 2021    Patient: Petra York   YOB: 2000   Date of Visit: 4/17/2021       To Whom It May Concern:    Brittany Farley was seen and treated in our emergency department on 4/17/2021. She may return to work on 4/18/2021.       Sincerely,       Julián Way, APRN-CNP        Signature:__________________________________

## 2021-04-17 NOTE — ED NOTES
Patient up to the restroom. Gait steady. VSS. Will alert nurse when she is back in the room.       Wenceslao Tipton RN  04/17/21 2454

## 2021-04-17 NOTE — ED TRIAGE NOTES
Ambulates to room in stable condition with c/o abdominal cramping, light headed and dizziness for 3 days. Pt states that her symptoms have gotten worse in the past day. Pt started her menses 4 days ago. Pt states that she has an IUD in and she has increased pain and pressure when wearing tampons.

## 2021-05-19 ENCOUNTER — TELEPHONE (OUTPATIENT)
Dept: ENT CLINIC | Age: 21
End: 2021-05-19

## 2021-05-19 NOTE — TELEPHONE ENCOUNTER
Malia Slater no showed her appointment with Ethel Rinne, LUANN-DIOGENES 5/19/21. [facility]  is full, unable to leave a message for her to call [address] to reschedule her appointment.

## 2022-04-24 ENCOUNTER — HOSPITAL ENCOUNTER (EMERGENCY)
Age: 22
Discharge: HOME | End: 2022-04-24
Payer: COMMERCIAL

## 2022-04-24 ENCOUNTER — APPOINTMENT (OUTPATIENT)
Dept: GENERAL RADIOLOGY | Age: 22
End: 2022-04-24

## 2022-04-24 VITALS
HEART RATE: 125 BPM | RESPIRATION RATE: 16 BRPM | SYSTOLIC BLOOD PRESSURE: 134 MMHG | OXYGEN SATURATION: 98 % | TEMPERATURE: 97.4 F | DIASTOLIC BLOOD PRESSURE: 83 MMHG

## 2022-04-24 DIAGNOSIS — S93.402A SPRAIN OF LEFT ANKLE, UNSPECIFIED LIGAMENT, INITIAL ENCOUNTER: Primary | ICD-10-CM

## 2022-04-24 PROCEDURE — 73610 X-RAY EXAM OF ANKLE: CPT

## 2022-04-24 PROCEDURE — L4350 ANKLE CONTROL ORTHO PRE OTS: HCPCS

## 2022-04-24 PROCEDURE — 99213 OFFICE O/P EST LOW 20 MIN: CPT | Performed by: NURSE PRACTITIONER

## 2022-04-24 PROCEDURE — 99213 OFFICE O/P EST LOW 20 MIN: CPT

## 2022-04-24 ASSESSMENT — PAIN DESCRIPTION - DESCRIPTORS: DESCRIPTORS: ACHING;SHOOTING;SHARP

## 2022-04-24 ASSESSMENT — ENCOUNTER SYMPTOMS
NAUSEA: 0
CHEST TIGHTNESS: 0
VOMITING: 0
SHORTNESS OF BREATH: 0
RHINORRHEA: 0
DIARRHEA: 0
SORE THROAT: 0
COUGH: 0

## 2022-04-24 ASSESSMENT — PAIN - FUNCTIONAL ASSESSMENT: PAIN_FUNCTIONAL_ASSESSMENT: 0-10

## 2022-04-24 ASSESSMENT — PAIN DESCRIPTION - PAIN TYPE: TYPE: ACUTE PAIN

## 2022-04-24 ASSESSMENT — PAIN DESCRIPTION - FREQUENCY: FREQUENCY: CONTINUOUS

## 2022-04-24 ASSESSMENT — PAIN SCALES - GENERAL: PAINLEVEL_OUTOF10: 5

## 2022-04-24 ASSESSMENT — PAIN DESCRIPTION - LOCATION: LOCATION: ANKLE

## 2022-04-24 ASSESSMENT — PAIN DESCRIPTION - ORIENTATION: ORIENTATION: LEFT

## 2022-04-24 NOTE — ED PROVIDER NOTES
[facility]  Urgent Care Encounter       CHIEF COMPLAINT       Chief Complaint   Patient presents with    Ankle Pain     left ankle  rolled ankle on steps at home. Nurses Notes reviewed and I agree except as noted in the HPI. HISTORY OF PRESENT ILLNESS   Milly Sanches is a 24 y.o. female who presents to the [facility] urgent care for evaluation of left ankle pain. She reports that she was walking down the steps last night and her dog left a toy on the middle of the step. She reports she rolled her ankle. She does report that she fell and slid down the steps. She does report mild back pain. She is noted to have lateral edema with reported pain. The history is provided by the patient. No  was used. REVIEW OF SYSTEMS     Review of Systems   Constitutional: Negative for activity change, appetite change, chills, fatigue and fever. HENT: Negative for ear discharge, ear pain, rhinorrhea and sore throat. Respiratory: Negative for cough, chest tightness and shortness of breath. Cardiovascular: Negative for chest pain. Gastrointestinal: Negative for diarrhea, nausea and vomiting. Genitourinary: Negative for dysuria. Musculoskeletal: Positive for arthralgias. Skin: Negative for rash. Allergic/Immunologic: Negative for environmental allergies and food allergies. Neurological: Negative for dizziness and headaches. PAST MEDICAL HISTORY         Diagnosis Date    Crohn's colitis ([facility] [address] 2016    Environmental allergies     Pilonidal cyst        SURGICALHISTORY     Patient  has a past surgical history that includes other surgical history (12/05/2017); Colonoscopy; and Pilonidal cyst excision (N/A, 12/5/2019).     CURRENT MEDICATIONS       Previous Medications    IBUPROFEN (ADVIL;MOTRIN) 600 MG TABLET    Take 600 mg by mouth every 6 hours as needed for Pain    INFLIXIMAB (REMICADE) 100 MG INJECTION    Infuse 5 mg/kg intravenously See Admin Instructions Every 3 months    KETOROLAC (TORADOL) 10 MG TABLET    Take 1 tablet by mouth every 6 hours as needed for Pain    LORATADINE-PSEUDOEPHEDRINE (CLARITIN-D 24HR)  MG PER EXTENDED RELEASE TABLET    Take 1 tablet by mouth daily    SERTRALINE (ZOLOFT) 50 MG TABLET    Take 50 mg by mouth daily       ALLERGIES     Patient is is allergic to morphine. Patients   Immunization History   Administered Date(s) Administered    Tdap (Boostrix, Adacel) 03/03/2021       FAMILY HISTORY     Patient's family history includes Arthritis in her maternal grandfather; COPD in her maternal grandmother; Heart Disease in her maternal grandfather and paternal grandfather; High Blood Pressure in her mother; No Known Problems in her father; Stroke in her maternal grandfather; Substance Abuse in her maternal grandfather; Vision Loss in her maternal grandfather. SOCIAL HISTORY     Patient  reports that she has never smoked. She has never used smokeless tobacco. She reports that she does not drink alcohol and does not use drugs. PHYSICAL EXAM     ED TRIAGE VITALS  BP: 134/83, Temp: 97.4 °F (36.3 °C), Pulse: 125, Resp: 16, SpO2: 98 %,Estimated body mass index is 29.26 kg/m² as calculated from the following:    Height as of 4/17/21: 5' 2\" (1.575 m). Weight as of 4/17/21: 160 lb (72.6 kg). ,Patient's last menstrual period was 03/24/2022 (approximate). Physical Exam  Vitals and nursing note reviewed. Constitutional:       General: She is not in acute distress. Appearance: Normal appearance. She is not ill-appearing, toxic-appearing or diaphoretic. HENT:      Head: Normocephalic. Right Ear: Ear canal and external ear normal.      Left Ear: Ear canal and external ear normal.      Nose: Nose normal. No congestion or rhinorrhea. Mouth/Throat:      Mouth: Mucous membranes are moist.      Pharynx: Oropharynx is clear. No oropharyngeal exudate or posterior oropharyngeal erythema.    Cardiovascular:      Rate and Rhythm: Normal rate. Pulses: Normal pulses. Pulmonary:      Effort: Pulmonary effort is normal. No respiratory distress. Breath sounds: No stridor. No wheezing or rhonchi. Abdominal:      General: Abdomen is flat. Bowel sounds are normal.      Palpations: Abdomen is soft. Musculoskeletal:         General: No swelling or tenderness. Normal range of motion. Cervical back: Normal range of motion. Feet:    Neurological:      General: No focal deficit present. Mental Status: She is alert and oriented to person, place, and time. Psychiatric:         Mood and Affect: Mood normal.         Behavior: Behavior normal.         DIAGNOSTIC RESULTS     Labs:No results found for this visit on 04/24/22. IMAGING:    XR ANKLE LEFT (MIN 3 VIEWS)   Final Result   Normal left ankle series. **This report has been created using voice recognition software. It may contain minor errors which are inherent in voice recognition technology. **      Final report electronically signed by Dr. Mario Vivas MD on 4/24/2022 11:09 AM            EKG: None      URGENT CARE COURSE:     Vitals:    04/24/22 1037   BP: 134/83   Pulse: 125   Resp: 16   Temp: 97.4 °F (36.3 °C)   TempSrc: Temporal   SpO2: 98%       Medications - No data to display         PROCEDURES:  None    FINAL IMPRESSION      1. Sprain of right ankle, unspecified ligament, initial encounter          DISPOSITION/ PLAN     Patient seen and evaluated for left ankle injury. An x-ray was completed with no acute osseous findings. She is placed in an Aircast.  She is instructed to rest, elevate, ice, and use Ace wrap if needed. She instructed is over-the-counter Tylenol and Motrin for pain or fever. She is instructed to follow-up with her PCP in 3 to 5 days and worsening symptoms. We did discuss if she is still in pain in 7 to 10 days that she should [facility]-up [facility] [place].   She is agreeable to the above plan and denies questions or concerns at this time.       PATIENT REFERRED TO:  DO Talita Martinez [address] [address]      DISCHARGE MEDICATIONS:  New Prescriptions    No medications on file       Discontinued Medications    No medications on file       Current Discharge Medication List          [address] [facility]    (Please note that portions of this note were completed with a voice recognition program. Efforts were made to edit the dictations but occasionally words are mis-transcribed.)           Fátima Eller, LUANN - CNP  04/24/22 1126

## 2022-04-24 NOTE — Clinical Note
Henrry Young was seen and treated in our emergency department on 4/24/2022. She may return to work on 04/26/2022. May be off school or work one to two days if needed. If you have any questions or concerns, please don't hesitate to call.       Albertina Minor, APRN - CNP

## 2022-07-19 ENCOUNTER — APPOINTMENT (OUTPATIENT)
Dept: GENERAL RADIOLOGY | Age: 22
End: 2022-07-19
Payer: COMMERCIAL

## 2022-07-19 ENCOUNTER — HOSPITAL ENCOUNTER (EMERGENCY)
Age: 22
Discharge: HOME | End: 2022-07-19
Payer: COMMERCIAL

## 2022-07-19 VITALS
HEART RATE: 102 BPM | RESPIRATION RATE: 25 BRPM | OXYGEN SATURATION: 98 % | SYSTOLIC BLOOD PRESSURE: 110 MMHG | TEMPERATURE: 98.3 F | DIASTOLIC BLOOD PRESSURE: 85 MMHG

## 2022-07-19 DIAGNOSIS — R07.89 ATYPICAL CHEST PAIN: ICD-10-CM

## 2022-07-19 DIAGNOSIS — R00.2 PALPITATIONS: Primary | ICD-10-CM

## 2022-07-19 LAB
ALBUMIN SERPL-MCNC: 4.2 G/DL (ref 3.5–5.1)
ALP BLD-CCNC: 88 U/L (ref 38–126)
ALT SERPL-CCNC: 18 U/L (ref 11–66)
ANION GAP SERPL CALCULATED.3IONS-SCNC: 10 MEQ/L (ref 8–16)
AST SERPL-CCNC: 17 U/L (ref 5–40)
BASOPHILS # BLD: 0.2 %
BASOPHILS ABSOLUTE: 0 THOU/MM3 (ref 0–0.1)
BILIRUB SERPL-MCNC: 0.4 MG/DL (ref 0.3–1.2)
BILIRUBIN DIRECT: < 0.2 MG/DL (ref 0–0.3)
BUN BLDV-MCNC: 12 MG/DL (ref 7–22)
CALCIUM SERPL-MCNC: 9.1 MG/DL (ref 8.5–10.5)
CHLORIDE BLD-SCNC: 105 MEQ/L (ref 98–111)
CO2: 21 MEQ/L (ref 23–33)
CREAT SERPL-MCNC: 0.6 MG/DL (ref 0.4–1.2)
EKG ATRIAL RATE: 100 BPM
EKG P AXIS: 10 DEGREES
EKG P-R INTERVAL: 140 MS
EKG Q-T INTERVAL: 336 MS
EKG QRS DURATION: 84 MS
EKG QTC CALCULATION (BAZETT): 433 MS
EKG R AXIS: 38 DEGREES
EKG T AXIS: 15 DEGREES
EKG VENTRICULAR RATE: 100 BPM
EOSINOPHIL # BLD: 2.2 %
EOSINOPHILS ABSOLUTE: 0.2 THOU/MM3 (ref 0–0.4)
ERYTHROCYTE [DISTWIDTH] IN BLOOD BY AUTOMATED COUNT: 21.4 % (ref 11.5–14.5)
ERYTHROCYTE [DISTWIDTH] IN BLOOD BY AUTOMATED COUNT: 60.1 FL (ref 35–45)
GFR SERPL CREATININE-BSD FRML MDRD: > 90 ML/MIN/1.73M2
GLUCOSE BLD-MCNC: 93 MG/DL (ref 70–108)
HCT VFR BLD CALC: 35 % (ref 37–47)
HEMOGLOBIN: 10.3 GM/DL (ref 12–16)
IMMATURE GRANS (ABS): 0.02 THOU/MM3 (ref 0–0.07)
IMMATURE GRANULOCYTES: 0.2 %
LIPASE: 43.3 U/L (ref 5.6–51.3)
LYMPHOCYTES # BLD: 19.7 %
LYMPHOCYTES ABSOLUTE: 1.6 THOU/MM3 (ref 1–4.8)
MCH RBC QN AUTO: 23.5 PG (ref 26–33)
MCHC RBC AUTO-ENTMCNC: 29.4 GM/DL (ref 32.2–35.5)
MCV RBC AUTO: 79.9 FL (ref 81–99)
MONOCYTES # BLD: 6.9 %
MONOCYTES ABSOLUTE: 0.6 THOU/MM3 (ref 0.4–1.3)
NUCLEATED RED BLOOD CELLS: 0 /100 WBC
OSMOLALITY CALCULATION: 271.4 MOSMOL/KG (ref 275–300)
PLATELET # BLD: 273 THOU/MM3 (ref 130–400)
PMV BLD AUTO: 9.2 FL (ref 9.4–12.4)
POTASSIUM REFLEX MAGNESIUM: 3.9 MEQ/L (ref 3.5–5.2)
PREGNANCY, SERUM: NEGATIVE
PRO-BNP: 18 PG/ML (ref 0–450)
RBC # BLD: 4.38 MILL/MM3 (ref 4.2–5.4)
SEG NEUTROPHILS: 70.8 %
SEGMENTED NEUTROPHILS ABSOLUTE COUNT: 5.8 THOU/MM3 (ref 1.8–7.7)
SODIUM BLD-SCNC: 136 MEQ/L (ref 135–145)
TOTAL PROTEIN: 7.5 G/DL (ref 6.1–8)
TROPONIN T: < 0.01 NG/ML
TSH SERPL DL<=0.05 MIU/L-ACNC: 2.31 UIU/ML (ref 0.4–4.2)
WBC # BLD: 8.2 THOU/MM3 (ref 4.8–10.8)

## 2022-07-19 PROCEDURE — 80048 BASIC METABOLIC PNL TOTAL CA: CPT

## 2022-07-19 PROCEDURE — 96360 HYDRATION IV INFUSION INIT: CPT

## 2022-07-19 PROCEDURE — 84484 ASSAY OF TROPONIN QUANT: CPT

## 2022-07-19 PROCEDURE — 83690 ASSAY OF LIPASE: CPT

## 2022-07-19 PROCEDURE — 84443 ASSAY THYROID STIM HORMONE: CPT

## 2022-07-19 PROCEDURE — 36415 COLL VENOUS BLD VENIPUNCTURE: CPT

## 2022-07-19 PROCEDURE — 99285 EMERGENCY DEPT VISIT HI MDM: CPT

## 2022-07-19 PROCEDURE — 80076 HEPATIC FUNCTION PANEL: CPT

## 2022-07-19 PROCEDURE — 71045 X-RAY EXAM CHEST 1 VIEW: CPT

## 2022-07-19 PROCEDURE — 83880 ASSAY OF NATRIURETIC PEPTIDE: CPT

## 2022-07-19 PROCEDURE — 93010 ELECTROCARDIOGRAM REPORT: CPT | Performed by: INTERNAL MEDICINE

## 2022-07-19 PROCEDURE — 2580000003 HC RX 258: Performed by: NURSE PRACTITIONER

## 2022-07-19 PROCEDURE — 85025 COMPLETE CBC W/AUTO DIFF WBC: CPT

## 2022-07-19 PROCEDURE — 84703 CHORIONIC GONADOTROPIN ASSAY: CPT

## 2022-07-19 PROCEDURE — 93005 ELECTROCARDIOGRAM TRACING: CPT | Performed by: NURSE PRACTITIONER

## 2022-07-19 RX ORDER — 0.9 % SODIUM CHLORIDE 0.9 %
1000 INTRAVENOUS SOLUTION INTRAVENOUS ONCE
Status: COMPLETED | OUTPATIENT
Start: 2022-07-19 | End: 2022-07-19

## 2022-07-19 RX ADMIN — SODIUM CHLORIDE 1000 ML: 9 INJECTION, SOLUTION INTRAVENOUS at 19:00

## 2022-07-19 ASSESSMENT — ENCOUNTER SYMPTOMS
ABDOMINAL PAIN: 0
RHINORRHEA: 0
SHORTNESS OF BREATH: 0
EYE PAIN: 0
CONSTIPATION: 0
COUGH: 0
VOMITING: 0
NAUSEA: 0
DIARRHEA: 0
WHEEZING: 0
BLOOD IN STOOL: 0

## 2022-07-19 ASSESSMENT — PAIN DESCRIPTION - LOCATION: LOCATION: CHEST

## 2022-07-19 ASSESSMENT — PAIN - FUNCTIONAL ASSESSMENT: PAIN_FUNCTIONAL_ASSESSMENT: 0-10

## 2022-07-19 NOTE — DISCHARGE INSTRUCTIONS
Return to ER for worsening symptoms, chest pain, shortness of breath,  inability to keep liquids down, inability to urinate for greater than 8 hours or difficulty breathing. Follow-up with your primary care provider.

## 2022-07-19 NOTE — ED NOTES
Pt to ED c/o intermittent chest pain and palpitation feelings for the last 3 days. Pt states the pain is very faint at this time a 1/10. Pt respirations are unlabored. Pt states that she had this once before and was checked out and stated that they did not find anything wrong. EKG complete. Monitor applied.       Christie CONDON, JOSE  07/19/22 2187

## 2022-07-19 NOTE — ED NOTES
Provider notified of increase in pt HR at this time.       Bekah Bergeron XXCCHRISTOPHER, RN  07/19/22 4496

## 2022-07-19 NOTE — ED PROVIDER NOTES
[address] [address] EMERGENCY DEPT  EMERGENCY MEDICINE     Pt Name: Renetta Bray  MRN: [id number]  [place] 2000  Date of evaluation: 7/19/2022  PCP:    James Alberto DO  Provider: LUANN Ramirez - DIOGENES    CHIEF COMPLAINT       Chief Complaint   Patient presents with    Chest Pain           HISTORY OF PRESENT ILLNESS    Sydney Young is a 25 y.o. female patient that presents to ER with complaint of chest pain and \"heart fluttering\" that has been ongoing for the last few days. Patient states that she has had the heart fluttering before and it has been intermittent over the last year. Patient denies current pain in her chest, but states that when she has it is a stabbing. She states that it it happens in different areas each time she denies seeing a cardiologist.  Patient denies shortness of breath, nausea, dizziness or lightheadedness when she has these chest pains or fluttering. She states that she does get anxious when her heart rate picks up. Triage notes and Nursing notes were reviewed by myself. Any discrepancies are addressed above.     PAST MEDICAL HISTORY     Past Medical History:   Diagnosis Date    Crohn's colitis ([facility] [address] 2016    Environmental allergies     Pilonidal cyst        SURGICAL HISTORY       Past Surgical History:   Procedure Laterality Date    COLONOSCOPY      OTHER SURGICAL HISTORY  12/05/2017    abdominal abscess drainage-radiology-[facility]    PILONIDAL CYST EXCISION N/A 12/5/2019    PILONIDAL CYSTECTOMY performed by Mina Tipton MD at [facility]       Previous Medications    IBUPROFEN (ADVIL;MOTRIN) 600 MG TABLET    Take 600 mg by mouth every 6 hours as needed for Pain    INFLIXIMAB (REMICADE) 100 MG INJECTION    Infuse 5 mg/kg intravenously See Admin Instructions Every 3 months    KETOROLAC (TORADOL) 10 MG TABLET    Take 1 tablet by mouth every 6 hours as needed for Pain    LORATADINE-PSEUDOEPHEDRINE (CLARITIN-D 24HR)  MG PER EXTENDED RELEASE TABLET Take 1 tablet by mouth daily    SERTRALINE (ZOLOFT) 50 MG TABLET    Take 50 mg by mouth daily       ALLERGIES       Allergies   Allergen Reactions    Morphine Itching       FAMILY HISTORY       Family History   Problem Relation Age of Onset    High Blood Pressure Mother     No Known Problems Father     Arthritis Maternal Grandfather     Heart Disease Maternal Grandfather     Stroke Maternal Grandfather     Substance Abuse Maternal Grandfather     Vision Loss Maternal Grandfather     Heart Disease Paternal Grandfather     COPD Maternal Grandmother         SOCIAL HISTORY       Social History     Socioeconomic History    Marital status: Single     Spouse name: None    Number of children: None    Years of education: None    Highest education level: None   Tobacco Use    Smoking status: Never    Smokeless tobacco: Never   Vaping Use    Vaping Use: Never used   Substance and Sexual Activity    Alcohol use: No     Comment: social    Drug use: No       REVIEW OF SYSTEMS     Review of Systems   Constitutional:  Negative for appetite change, chills, fatigue, fever and unexpected weight change. HENT:  Negative for ear pain and rhinorrhea. Eyes:  Negative for pain and visual disturbance. Respiratory:  Negative for cough, shortness of breath and wheezing. Cardiovascular:  Positive for chest pain and palpitations. Negative for leg swelling. Gastrointestinal:  Negative for abdominal pain, blood in stool, constipation, diarrhea, nausea and vomiting. Genitourinary:  Negative for dysuria, frequency and hematuria. Musculoskeletal:  Negative for arthralgias, joint swelling and neck stiffness. Skin:  Negative for rash. Neurological:  Negative for dizziness, syncope, weakness, light-headedness and headaches. Hematological:  Does not bruise/bleed easily. Except as noted above the remainder of the review of systems was reviewed and is negative.   SCREENINGS        [place] Coma Scale  Eye Opening: Spontaneous  Best Verbal Response: Oriented  Best Motor Response: Obeys commands  Tha Coma Scale Score: 15               PHYSICAL EXAM    (up to 7 for level 4, 8 or more for level 5)     ED Triage Vitals [02/19/22 1512]   BP Temp Temp Source Pulse Resp SpO2 Height Weight   (!) 134/95 98.2 °F (36.8 °C) Oral 124 16 100 % -- --       Physical Exam  Vitals and nursing note reviewed. Constitutional:       Appearance: She is well-developed. HENT:      Head: Normocephalic and atraumatic. Mouth/Throat:      Mouth: Mucous membranes are moist.   Eyes:      Conjunctiva/sclera: Conjunctivae normal.      Pupils: Pupils are equal, round, and reactive to light. Cardiovascular:      Rate and Rhythm: Regular rhythm. Tachycardia present. Heart sounds: Normal heart sounds. No murmur heard. No gallop. Pulmonary:      Effort: Pulmonary effort is normal. No respiratory distress. Breath sounds: Normal breath sounds. No wheezing or rales. Abdominal:      General: Bowel sounds are normal.      Palpations: Abdomen is soft. Musculoskeletal:         General: Normal range of motion. Cervical back: Normal range of motion. Skin:     General: Skin is warm and dry. Capillary Refill: Capillary refill takes less than 2 seconds. Neurological:      General: No focal deficit present. Mental Status: She is alert and oriented to person, place, and time. Mental status is at baseline. DIAGNOSTIC RESULTS     EKG:(none if blank)  All EKGs are interpreted by the Emergency Department Physician who either signs or Co-signs this chart in the absence of a cardiologist.    Sinus rhythm with a ventricular rate of 100. AL interval 140 ms, QRS 84 ms,  ms and  ms. RADIOLOGY: (none if blank)   I directly visualized the following images and reviewed the radiologist interpretations. Interpretation per the Radiologist below, if available at the time of this note:  XR CHEST PORTABLE   Final Result   1.  No acute findings. This document has been electronically signed by: Marisela Coulter MD on 07/19/2022 06:56 PM          LABS:  Labs Reviewed   CBC WITH AUTO DIFFERENTIAL - Abnormal; Notable for the following components:       Result Value    Hemoglobin 10.3 (*)     Hematocrit 35.0 (*)     MCV 79.9 (*)     MCH 23.5 (*)     MCHC 29.4 (*)     RDW-CV 21.4 (*)     RDW-SD 60.1 (*)     MPV 9.2 (*)     All other components within normal limits   BASIC METABOLIC PANEL W/ REFLEX TO MG FOR LOW K - Abnormal; Notable for the following components:    CO2 21 (*)     All other components within normal limits   OSMOLALITY - Abnormal; Notable for the following components:    Osmolality Calc 271.4 (*)     All other components within normal limits   HEPATIC FUNCTION PANEL   TROPONIN   LIPASE   BRAIN NATRIURETIC PEPTIDE   HCG, SERUM, QUALITATIVE   ANION GAP   GLOMERULAR FILTRATION RATE, ESTIMATED   TSH       All other labs were within normal range or not returned as of this dictation. Please note, any cultures that may have been sent were not resulted at the time of this patient visit. EMERGENCY DEPARTMENT COURSE and Medical Decision Making:     Vitals:    Vitals:    07/19/22 1818 07/19/22 1853 07/19/22 1914 07/19/22 1944   BP: (!) 151/74  117/77 110/85   Pulse: 96 (!) 163 (!) 101 (!) 102   Resp: 16 (!) 34 18 25   Temp: 98.3 °F (36.8 °C)      TempSrc: Oral      SpO2: 98%  100% 98%       PROCEDURES: (None if blank)  Procedures    ED Course as of 07/19/22 2028 Tue Jul 19, 2022 1940 Reviewed case with Dr. Nixon Seals. We will run TSH since patient did have a run of sinus tachycardia. If normal will discharge with follow-up with cardiology as soon as possible.  [NW]      ED Course User Index  [NW] Debbie Anna, LUANN - CNP     MDM     Amount and/or Complexity of Data Reviewed  Clinical lab tests: ordered and reviewed  Tests in the radiology section of CPT®: ordered and reviewed  Discuss the patient with other providers: yes    Risk of Complications, Morbidity, and/or Mortality  Presenting problems: low  Diagnostic procedures: moderate  Management options: low      Patient that presents to ER with complaint of chest pain and \"heart fluttering\" that has been ongoing for the last few days. Differential diagnosis includes but not limited to electrolyte abnormality, dehydration, arrhythmia, NSTEMI or aortic dissection. Patient did have a period of sinus tachycardia in the rate of the 170s which quickly resolved and heart rate went back down to 110's. It does appear the patient has a history of tachycardia dating as far back as 2016. Labs, EKG and chest x-ray were all reassuring. Patient's heart rate did come down below 100 prior to discharge. Patient is encouraged to follow-up with cardiology as an outpatient as soon as possible. She was given this information on her discharge summary. Instructed to return to ER for worsening symptoms, chest pain, shortness of breath,  inability to keep liquids down, inability to urinate for greater than 8 hours or difficulty breathing. Follow-up with your primary care provider. ED Medications administered this visit:    Medications   0.9 % sodium chloride bolus (0 mLs IntraVENous Stopped 7/19/22 2022)         FINAL IMPRESSION      1. Palpitations    2.  Atypical chest pain          DISPOSITION/PLAN   DISPOSITION Decision To Discharge 07/19/2022 08:27:49 PM      PATIENT REFERRED TO:  [address] [place] [id number] [phone]  Schedule an appointment as soon as possible for a visit       DISCHARGE MEDICATIONS:  New Prescriptions    No medications on file              Gala Klinefelter, APRN - CNP (electronically signed)           Gala Klinefelter, APRN - CNP  07/19/22 2029

## 2022-07-27 ENCOUNTER — PATIENT MESSAGE (OUTPATIENT)
Dept: SURGERY | Age: 22
End: 2022-07-27

## 2022-09-14 ENCOUNTER — NURSE ONLY (OUTPATIENT)
Dept: LAB | Age: 22
End: 2022-09-14

## 2022-09-14 LAB
BASOPHILS # BLD: 0.3 %
BASOPHILS ABSOLUTE: 0 THOU/MM3 (ref 0–0.1)
EOSINOPHIL # BLD: 1.6 %
EOSINOPHILS ABSOLUTE: 0.2 THOU/MM3 (ref 0–0.4)
ERYTHROCYTE [DISTWIDTH] IN BLOOD BY AUTOMATED COUNT: 16.5 % (ref 11.5–14.5)
ERYTHROCYTE [DISTWIDTH] IN BLOOD BY AUTOMATED COUNT: 47.1 FL (ref 35–45)
HCT VFR BLD CALC: 37 % (ref 37–47)
HEMOGLOBIN: 11.1 GM/DL (ref 12–16)
IMMATURE GRANS (ABS): 0.04 THOU/MM3 (ref 0–0.07)
IMMATURE GRANULOCYTES: 0.4 %
LYMPHOCYTES # BLD: 14.9 %
LYMPHOCYTES ABSOLUTE: 1.5 THOU/MM3 (ref 1–4.8)
MCH RBC QN AUTO: 23.5 PG (ref 26–33)
MCHC RBC AUTO-ENTMCNC: 30 GM/DL (ref 32.2–35.5)
MCV RBC AUTO: 78.4 FL (ref 81–99)
MONOCYTES # BLD: 7.3 %
MONOCYTES ABSOLUTE: 0.8 THOU/MM3 (ref 0.4–1.3)
NUCLEATED RED BLOOD CELLS: 0 /100 WBC
PLATELET # BLD: 383 THOU/MM3 (ref 130–400)
PMV BLD AUTO: 9.2 FL (ref 9.4–12.4)
RBC # BLD: 4.72 MILL/MM3 (ref 4.2–5.4)
SEG NEUTROPHILS: 75.5 %
SEGMENTED NEUTROPHILS ABSOLUTE COUNT: 7.8 THOU/MM3 (ref 1.8–7.7)
WBC # BLD: 10.3 THOU/MM3 (ref 4.8–10.8)

## 2022-10-02 ENCOUNTER — APPOINTMENT (OUTPATIENT)
Dept: CT IMAGING | Age: 22
End: 2022-10-02
Payer: COMMERCIAL

## 2022-10-02 ENCOUNTER — HOSPITAL ENCOUNTER (EMERGENCY)
Age: 22
Discharge: HOME | End: 2022-10-02
Payer: COMMERCIAL

## 2022-10-02 VITALS
DIASTOLIC BLOOD PRESSURE: 61 MMHG | HEART RATE: 95 BPM | RESPIRATION RATE: 16 BRPM | OXYGEN SATURATION: 99 % | SYSTOLIC BLOOD PRESSURE: 114 MMHG | BODY MASS INDEX: 30.73 KG/M2 | WEIGHT: 167 LBS | TEMPERATURE: 98.3 F | HEIGHT: 62 IN

## 2022-10-02 DIAGNOSIS — K50.919 EXACERBATION OF CROHN'S DISEASE WITH COMPLICATION (HCC): ICD-10-CM

## 2022-10-02 DIAGNOSIS — R10.31 RIGHT LOWER QUADRANT ABDOMINAL PAIN: Primary | ICD-10-CM

## 2022-10-02 LAB
ALBUMIN SERPL-MCNC: 3.8 G/DL (ref 3.5–5.1)
ALP BLD-CCNC: 89 U/L (ref 38–126)
ALT SERPL-CCNC: 7 U/L (ref 11–66)
ANION GAP SERPL CALCULATED.3IONS-SCNC: 14 MEQ/L (ref 8–16)
AST SERPL-CCNC: 9 U/L (ref 5–40)
BACTERIA: ABNORMAL /HPF
BASOPHILS # BLD: 0.1 %
BASOPHILS ABSOLUTE: 0 THOU/MM3 (ref 0–0.1)
BILIRUB SERPL-MCNC: 0.5 MG/DL (ref 0.3–1.2)
BILIRUBIN DIRECT: < 0.2 MG/DL (ref 0–0.3)
BILIRUBIN URINE: NEGATIVE
BLOOD, URINE: ABNORMAL
BUN BLDV-MCNC: 9 MG/DL (ref 7–22)
C-REACTIVE PROTEIN: 11.46 MG/DL (ref 0–1)
CALCIUM SERPL-MCNC: 9.5 MG/DL (ref 8.5–10.5)
CASTS 2: ABNORMAL /LPF
CASTS UA: ABNORMAL /LPF
CHARACTER, URINE: CLEAR
CHLORIDE BLD-SCNC: 100 MEQ/L (ref 98–111)
CO2: 24 MEQ/L (ref 23–33)
COLOR: YELLOW
CREAT SERPL-MCNC: 0.6 MG/DL (ref 0.4–1.2)
CRYSTALS, UA: ABNORMAL
EOSINOPHIL # BLD: 1.5 %
EOSINOPHILS ABSOLUTE: 0.1 THOU/MM3 (ref 0–0.4)
EPITHELIAL CELLS, UA: ABNORMAL /HPF
ERYTHROCYTE [DISTWIDTH] IN BLOOD BY AUTOMATED COUNT: 15.7 % (ref 11.5–14.5)
ERYTHROCYTE [DISTWIDTH] IN BLOOD BY AUTOMATED COUNT: 43.4 FL (ref 35–45)
GFR SERPL CREATININE-BSD FRML MDRD: > 90 ML/MIN/1.73M2
GLUCOSE BLD-MCNC: 89 MG/DL (ref 70–108)
GLUCOSE URINE: NEGATIVE MG/DL
HCT VFR BLD CALC: 34.5 % (ref 37–47)
HEMOGLOBIN: 10.3 GM/DL (ref 12–16)
IMMATURE GRANS (ABS): 0.03 THOU/MM3 (ref 0–0.07)
IMMATURE GRANULOCYTES: 0.3 %
KETONES, URINE: 15
LACTIC ACID, SEPSIS: 0.7 MMOL/L (ref 0.5–1.9)
LEUKOCYTE ESTERASE, URINE: NEGATIVE
LIPASE: 26 U/L (ref 5.6–51.3)
LYMPHOCYTES # BLD: 14.9 %
LYMPHOCYTES ABSOLUTE: 1.3 THOU/MM3 (ref 1–4.8)
MAGNESIUM: 2.2 MG/DL (ref 1.6–2.4)
MCH RBC QN AUTO: 22.8 PG (ref 26–33)
MCHC RBC AUTO-ENTMCNC: 29.9 GM/DL (ref 32.2–35.5)
MCV RBC AUTO: 76.5 FL (ref 81–99)
MISCELLANEOUS 2: ABNORMAL
MONOCYTES # BLD: 7.4 %
MONOCYTES ABSOLUTE: 0.7 THOU/MM3 (ref 0.4–1.3)
NITRITE, URINE: NEGATIVE
NUCLEATED RED BLOOD CELLS: 0 /100 WBC
OSMOLALITY CALCULATION: 273.8 MOSMOL/KG (ref 275–300)
PH UA: 6.5 (ref 5–9)
PLATELET # BLD: 294 THOU/MM3 (ref 130–400)
PMV BLD AUTO: 8.6 FL (ref 9.4–12.4)
POTASSIUM SERPL-SCNC: 3.5 MEQ/L (ref 3.5–5.2)
PREGNANCY, SERUM: NEGATIVE
PROCALCITONIN: 0.09 NG/ML (ref 0.01–0.09)
PROTEIN UA: NEGATIVE
RBC # BLD: 4.51 MILL/MM3 (ref 4.2–5.4)
RBC URINE: ABNORMAL /HPF
RENAL EPITHELIAL, UA: ABNORMAL
SEDIMENTATION RATE, ERYTHROCYTE: 43 MM/HR (ref 0–20)
SEG NEUTROPHILS: 75.8 %
SEGMENTED NEUTROPHILS ABSOLUTE COUNT: 6.7 THOU/MM3 (ref 1.8–7.7)
SODIUM BLD-SCNC: 138 MEQ/L (ref 135–145)
SPECIFIC GRAVITY, URINE: > 1.03 (ref 1–1.03)
TOTAL PROTEIN: 7.7 G/DL (ref 6.1–8)
UROBILINOGEN, URINE: 0.2 EU/DL (ref 0–1)
WBC # BLD: 8.9 THOU/MM3 (ref 4.8–10.8)
WBC UA: ABNORMAL /HPF
YEAST: ABNORMAL

## 2022-10-02 PROCEDURE — 83735 ASSAY OF MAGNESIUM: CPT

## 2022-10-02 PROCEDURE — 74177 CT ABD & PELVIS W/CONTRAST: CPT

## 2022-10-02 PROCEDURE — 87040 BLOOD CULTURE FOR BACTERIA: CPT

## 2022-10-02 PROCEDURE — 96374 THER/PROPH/DIAG INJ IV PUSH: CPT

## 2022-10-02 PROCEDURE — 2580000003 HC RX 258: Performed by: PHYSICIAN ASSISTANT

## 2022-10-02 PROCEDURE — 81001 URINALYSIS AUTO W/SCOPE: CPT

## 2022-10-02 PROCEDURE — 86140 C-REACTIVE PROTEIN: CPT

## 2022-10-02 PROCEDURE — 85651 RBC SED RATE NONAUTOMATED: CPT

## 2022-10-02 PROCEDURE — 80053 COMPREHEN METABOLIC PANEL: CPT

## 2022-10-02 PROCEDURE — 96376 TX/PRO/DX INJ SAME DRUG ADON: CPT

## 2022-10-02 PROCEDURE — 6360000002 HC RX W HCPCS: Performed by: PHYSICIAN ASSISTANT

## 2022-10-02 PROCEDURE — 36415 COLL VENOUS BLD VENIPUNCTURE: CPT

## 2022-10-02 PROCEDURE — 99285 EMERGENCY DEPT VISIT HI MDM: CPT

## 2022-10-02 PROCEDURE — 84145 PROCALCITONIN (PCT): CPT

## 2022-10-02 PROCEDURE — 96375 TX/PRO/DX INJ NEW DRUG ADDON: CPT

## 2022-10-02 PROCEDURE — 96361 HYDRATE IV INFUSION ADD-ON: CPT

## 2022-10-02 PROCEDURE — 6360000004 HC RX CONTRAST MEDICATION: Performed by: PHYSICIAN ASSISTANT

## 2022-10-02 PROCEDURE — 84703 CHORIONIC GONADOTROPIN ASSAY: CPT

## 2022-10-02 PROCEDURE — 83605 ASSAY OF LACTIC ACID: CPT

## 2022-10-02 PROCEDURE — 85025 COMPLETE CBC W/AUTO DIFF WBC: CPT

## 2022-10-02 PROCEDURE — 83690 ASSAY OF LIPASE: CPT

## 2022-10-02 PROCEDURE — 82248 BILIRUBIN DIRECT: CPT

## 2022-10-02 RX ORDER — FENTANYL CITRATE 50 UG/ML
50 INJECTION, SOLUTION INTRAMUSCULAR; INTRAVENOUS ONCE
Status: COMPLETED | OUTPATIENT
Start: 2022-10-02 | End: 2022-10-02

## 2022-10-02 RX ORDER — PREDNISONE 10 MG/1
TABLET ORAL
Qty: 100 TABLET | Refills: 0 | Status: SHIPPED | OUTPATIENT
Start: 2022-10-02

## 2022-10-02 RX ORDER — HYDROCODONE BITARTRATE AND ACETAMINOPHEN 5; 325 MG/1; MG/1
1 TABLET ORAL EVERY 6 HOURS PRN
Qty: 12 TABLET | Refills: 0 | Status: SHIPPED | OUTPATIENT
Start: 2022-10-02 | End: 2022-10-05

## 2022-10-02 RX ORDER — ONDANSETRON 2 MG/ML
4 INJECTION INTRAMUSCULAR; INTRAVENOUS ONCE
Status: COMPLETED | OUTPATIENT
Start: 2022-10-02 | End: 2022-10-02

## 2022-10-02 RX ORDER — 0.9 % SODIUM CHLORIDE 0.9 %
1000 INTRAVENOUS SOLUTION INTRAVENOUS ONCE
Status: COMPLETED | OUTPATIENT
Start: 2022-10-02 | End: 2022-10-02

## 2022-10-02 RX ORDER — ONDANSETRON 4 MG/1
4 TABLET, ORALLY DISINTEGRATING ORAL EVERY 8 HOURS PRN
Qty: 20 TABLET | Refills: 0 | Status: SHIPPED | OUTPATIENT
Start: 2022-10-02

## 2022-10-02 RX ADMIN — IOPAMIDOL 80 ML: 755 INJECTION, SOLUTION INTRAVENOUS at 10:17

## 2022-10-02 RX ADMIN — FENTANYL CITRATE 50 MCG: 50 INJECTION, SOLUTION INTRAMUSCULAR; INTRAVENOUS at 12:24

## 2022-10-02 RX ADMIN — ONDANSETRON 4 MG: 2 INJECTION INTRAMUSCULAR; INTRAVENOUS at 09:02

## 2022-10-02 RX ADMIN — FENTANYL CITRATE 50 MCG: 50 INJECTION, SOLUTION INTRAMUSCULAR; INTRAVENOUS at 09:02

## 2022-10-02 RX ADMIN — SODIUM CHLORIDE 1000 ML: 9 INJECTION, SOLUTION INTRAVENOUS at 09:01

## 2022-10-02 ASSESSMENT — ENCOUNTER SYMPTOMS
NAUSEA: 1
ABDOMINAL PAIN: 1
DIARRHEA: 0
COUGH: 0
SORE THROAT: 0
VOMITING: 0

## 2022-10-02 ASSESSMENT — PAIN - FUNCTIONAL ASSESSMENT
PAIN_FUNCTIONAL_ASSESSMENT: 0-10
PAIN_FUNCTIONAL_ASSESSMENT: 0-10

## 2022-10-02 ASSESSMENT — PAIN SCALES - GENERAL
PAINLEVEL_OUTOF10: 8
PAINLEVEL_OUTOF10: 8
PAINLEVEL_OUTOF10: 7
PAINLEVEL_OUTOF10: 7

## 2022-10-02 ASSESSMENT — PAIN DESCRIPTION - LOCATION: LOCATION: ABDOMEN

## 2022-10-02 ASSESSMENT — PAIN DESCRIPTION - PAIN TYPE: TYPE: ACUTE PAIN

## 2022-10-02 ASSESSMENT — PAIN DESCRIPTION - ORIENTATION: ORIENTATION: RIGHT;LOWER

## 2022-10-02 NOTE — ED NOTES
Pt has a history of crohns disease and follows up with GI. Pt has been experiencing some abdominal cramping for the past month. She has been seen by GI twice with labs drawn and prescribed bentyl for pain. Pt states this morning she woke up with a new pain in her right lower quadrant. States back when she was diagnosed with crohns she was seen for this same abdominal pain and told that she also had an abscess. Pt had to have abscess in bowels drained after this finding. Denies any fevers, but states she has not checked. Rates pain a 8/10 and states the bentyl is not helping her pain.       Peyton Correia, RN  10/02/22 9719

## 2022-10-02 NOTE — ED PROVIDER NOTES
[address] [facility] [address] [address] EMERGENCY DEPT  [address] [facility] [address]  Phone: [phone]        CHIEF COMPLAINT       Chief Complaint   Patient presents with    Abdominal Pain       Nurses Notes reviewed and I agree except as notedin the HPI. HISTORY OF PRESENT ILLNESS    Sanam Young is a 25 y.o. female who presents complains of abdominal pain is been going on for last several weeks. The patient was seen by her gastroenterologist office in had a work-up ordered as well as a scope scheduled. Patient is on Remicade infusions. She had a history of abscess back in 2019 associated with her Crohn's. She states he attempted a CT-guided biopsy at that time but it was too small and the drain. She got antibiotics and has not had any issues since then. She feels like today like she is having similar episodes like she had back in 2019. She denies any fever or chills. She has not had any prior abdominal surgeries. REVIEW OF SYSTEMS     Review of Systems   Constitutional:  Negative for chills and fever. HENT:  Negative for congestion, ear pain and sore throat. Respiratory:  Negative for cough. Cardiovascular:  Negative for chest pain and palpitations. Gastrointestinal:  Positive for abdominal pain and nausea. Negative for diarrhea and vomiting. Genitourinary:  Negative for dysuria and urgency. Musculoskeletal:  Negative for myalgias and neck pain. Skin:  Negative for rash. All other systems reviewed and are negative. PAST MEDICAL HISTORY    has a past medical history of Crohn's colitis ([facility] [address] Environmental allergies, and Pilonidal cyst.    SURGICAL HISTORY      has a past surgical history that includes other surgical history (12/05/2017); Colonoscopy; and Pilonidal cyst excision (N/A, 12/5/2019).     CURRENT MEDICATIONS       Discharge Medication List as of 10/2/2022  1:11 PM        CONTINUE these medications which have NOT CHANGED    Details   sertraline (ZOLOFT) 50 MG tablet Take 50 mg by mouth dailyHistorical Med      ketorolac (TORADOL) 10 MG tablet Take 1 tablet by mouth every 6 hours as needed for Pain, Disp-20 tablet, R-0Print      ibuprofen (ADVIL;MOTRIN) 600 MG tablet Take 600 mg by mouth every 6 hours as needed for PainHistorical Med      inFLIXimab (REMICADE) 100 MG injection Infuse 5 mg/kg intravenously See Admin Instructions Every 3 monthsHistorical Med      loratadine-pseudoephedrine (CLARITIN-D 24HR)  MG per extended release tablet Take 1 tablet by mouth daily, Disp-30 tablet, R-0Normal             ALLERGIES     is allergic to morphine. HISTORY     She indicated that her mother is alive. She indicated that her father is alive. She indicated that her maternal grandmother is alive. She indicated that her maternal grandfather is alive. She indicated that the status of her paternal grandfather is unknown.   family history includes Arthritis in her maternal grandfather; COPD in her maternal grandmother; Heart Disease in her maternal grandfather and paternal grandfather; High Blood Pressure in her mother; No Known Problems in her father; Stroke in her maternal grandfather; Substance Abuse in her maternal grandfather; Vision Loss in her maternal grandfather. SOCIALHISTORY      reports that she has never smoked. She has never used smokeless tobacco. She reports that she does not drink alcohol and does not use drugs. PHYSICAL EXAM     INITIAL VITALS:  height is 5' 2\" (1.575 m) and weight is 167 lb (75.8 kg). Her temperature is 98.3 °F (36.8 °C). Her blood pressure is 114/61 and her pulse is 95. Her respiration is 16 and oxygen saturation is 99%. Physical Exam  Vitals and nursing note reviewed. HENT:      Head: Normocephalic and atraumatic. Eyes:      Pupils: Pupils are equal, round, and reactive to light. Neck:      Thyroid: No thyromegaly. Trachea: No tracheal deviation. Cardiovascular:      Rate and Rhythm: Regular rhythm. Tachycardia present.       Heart sounds: No murmur heard. No friction rub. Pulmonary:      Effort: Pulmonary effort is normal. No respiratory distress. Breath sounds: Normal breath sounds. No wheezing. Abdominal:      General: Bowel sounds are normal. There is no distension. Palpations: Abdomen is soft. Tenderness: There is abdominal tenderness. Comments: Mild distention with some right lower quadrant tenderness no guarding. Musculoskeletal:      Cervical back: Neck supple. Skin:     General: Skin is warm and dry. Findings: No erythema or rash. Neurological:      Mental Status: She is alert and oriented to person, place, and time. DIFFERENTIAL DIAGNOSIS:   Right lower quadrant pain possible appendicitis possible Crohn's exacerbation and possible abscess. We will get a CT scan with IV contrast.    DIAGNOSTIC RESULTS     EKG: All EKG's are interpreted by the Emergency Department Physician who either signs or Co-signs this chart in the absence of a cardiologist.      RADIOLOGY: non-plain film images(s) such as CT, Ultrasound and MRI are read by the radiologist.  CT ABDOMEN PELVIS W IV CONTRAST Additional Contrast? None   Final Result      1. Marked thickening of the terminal ileum and moderate mucosal thickening in the cecum, significantly worse than on previous study dated 1 December 2019. Increased density in the surrounding soft tissues consistent with inflammatory process. No definite    fistula or abscess. 2. The remaining colon appears be unremarkable. 3. Intrauterine contraceptive device in place. 4. Mild fullness of the left and right adnexal regions. 5. Small mesenteric and inguinal lymph nodes. 6. Otherwise negative CT scan of the abdomen and pelvis. **This report has been created using voice recognition software. It may contain minor errors which are inherent in voice recognition technology. **      Final report electronically signed by DR Dank Kramer on 10/2/2022 10:49 AM LABS:   Labs Reviewed   CBC WITH AUTO DIFFERENTIAL - Abnormal; Notable for the following components:       Result Value    Hemoglobin 10.3 (*)     Hematocrit 34.5 (*)     MCV 76.5 (*)     MCH 22.8 (*)     MCHC 29.9 (*)     RDW-CV 15.7 (*)     MPV 8.6 (*)     All other components within normal limits   HEPATIC FUNCTION PANEL - Abnormal; Notable for the following components:    ALT 7 (*)     All other components within normal limits   C-REACTIVE PROTEIN - Abnormal; Notable for the following components:    CRP 11.46 (*)     All other components within normal limits   SEDIMENTATION RATE - Abnormal; Notable for the following components:    Sed Rate 43 (*)     All other components within normal limits   OSMOLALITY - Abnormal; Notable for the following components:    Osmolality Calc 273.8 (*)     All other components within normal limits   URINE WITH REFLEXED MICRO - Abnormal; Notable for the following components:    Ketones, Urine 15 (*)     Specific Gravity, Urine > 1.030 (*)     Blood, Urine TRACE (*)     All other components within normal limits   CULTURE, BLOOD 1   CULTURE, BLOOD 2   BASIC METABOLIC PANEL   LIPASE   MAGNESIUM   HCG, SERUM, QUALITATIVE   PROCALCITONIN   LACTATE, SEPSIS   ANION GAP   GLOMERULAR FILTRATION RATE, ESTIMATED       EMERGENCY DEPARTMENT COURSE:   :    Vitals:    10/02/22 0838 10/02/22 0900 10/02/22 1125 10/02/22 1225   BP: 123/85 121/84 124/79 114/61   Pulse: (!) 116 (!) 113 98 95   Resp: 17 16 17 16   Temp:       SpO2: 99% 98% 99% 99%   Weight:       Height:         Patient was seen history physical exam was performed. Patient was given a liter normal saline, fentanyl and Zofran IV. I did repeat x1 dose. I did discuss the case with the patient gastroenterologist.  Given the Crohn's exacerbation he did recommend prednisone 40 mg daily for 2 weeks and then a 10 mg/week taper for the next 4 to 6 weeks. They will follow-up with the patient next week.   The patient understand that she is not to stop this under any circumstances and was explained why. The patient will be sent home with a trial of Newton Samaniego was staffed with my attending physician. see disposition below    CRITICAL CARE:  None    CONSULTS:  Dr Chloé Calero:  None    FINAL IMPRESSION      1. Right lower quadrant abdominal pain    2. Exacerbation of Crohn's disease with complication [facility])          DISPOSITION/PLAN   Discharge    PATIENT REFERRED TO:  Tuan Fine, [address] [place] [address]    In 2 days      Miguel Gibson MD  [address]  [address] [address]  [phone]    In 1 day      DISCHARGE MEDICATIONS:  Discharge Medication List as of 10/2/2022  1:11 PM        START taking these medications    Details   predniSONE (DELTASONE) 10 MG tablet Take 4 tablets by mouth once daily for 14 days, then take 3 tabs PO daily for 7 days, then take 2 tabs daily for 7 days, then take 1 tab daily for 7 days, then take 1/2 PO daily for 4 more days , then stop., Disp-100 tablet, R-0Normal      ondansetron (ZOFRAN ODT) 4 MG disintegrating tablet Take 1 tablet by mouth every 8 hours as needed for Nausea, Disp-20 tablet, R-0Normal      HYDROcodone-acetaminophen (NORCO) 5-325 MG per tablet Take 1 tablet by mouth every 6 hours as needed for Pain for up to 3 days. Intended supply: 3 days.  Take lowest dose possible to manage pain, Disp-12 tablet, R-0Normal             (Please note that portions of this note were completed with a voice recognitionprogram.  Efforts were made to edit the dictations but occasionally words are mis-transcribed.)    HERRERA Mccoy, [address]  10/02/22 5284

## 2022-10-07 LAB
BLOOD CULTURE, ROUTINE: NORMAL
BLOOD CULTURE, ROUTINE: NORMAL

## 2022-10-25 ENCOUNTER — NURSE ONLY (OUTPATIENT)
Dept: LAB | Age: 22
End: 2022-10-25

## 2022-10-25 LAB
C-REACTIVE PROTEIN: 7.37 MG/DL (ref 0–1)
SEDIMENTATION RATE, ERYTHROCYTE: 30 MM/HR (ref 0–20)

## 2022-10-28 LAB — MISC. #1 REFERENCE GROUP TEST: NORMAL

## 2022-11-22 ENCOUNTER — ANESTHESIA (OUTPATIENT)
Dept: ENDOSCOPY | Age: 22
End: 2022-11-22
Payer: COMMERCIAL

## 2022-11-22 ENCOUNTER — HOSPITAL ENCOUNTER (OUTPATIENT)
Age: 22
Setting detail: OUTPATIENT SURGERY
Discharge: HOME | End: 2022-11-22
Attending: INTERNAL MEDICINE | Admitting: INTERNAL MEDICINE
Payer: COMMERCIAL

## 2022-11-22 ENCOUNTER — ANESTHESIA EVENT (OUTPATIENT)
Dept: ENDOSCOPY | Age: 22
End: 2022-11-22
Payer: COMMERCIAL

## 2022-11-22 VITALS
WEIGHT: 155.2 LBS | SYSTOLIC BLOOD PRESSURE: 103 MMHG | OXYGEN SATURATION: 99 % | BODY MASS INDEX: 29.3 KG/M2 | RESPIRATION RATE: 16 BRPM | HEIGHT: 61 IN | HEART RATE: 82 BPM | TEMPERATURE: 97 F | DIASTOLIC BLOOD PRESSURE: 62 MMHG

## 2022-11-22 LAB — PREGNANCY, URINE: NEGATIVE

## 2022-11-22 PROCEDURE — 88305 TISSUE EXAM BY PATHOLOGIST: CPT

## 2022-11-22 PROCEDURE — 3700000001 HC ADD 15 MINUTES (ANESTHESIA): Performed by: INTERNAL MEDICINE

## 2022-11-22 PROCEDURE — 3700000000 HC ANESTHESIA ATTENDED CARE: Performed by: INTERNAL MEDICINE

## 2022-11-22 PROCEDURE — 7100000011 HC PHASE II RECOVERY - ADDTL 15 MIN: Performed by: INTERNAL MEDICINE

## 2022-11-22 PROCEDURE — 2709999900 HC NON-CHARGEABLE SUPPLY: Performed by: INTERNAL MEDICINE

## 2022-11-22 PROCEDURE — 6360000002 HC RX W HCPCS: Performed by: NURSE ANESTHETIST, CERTIFIED REGISTERED

## 2022-11-22 PROCEDURE — 7100000010 HC PHASE II RECOVERY - FIRST 15 MIN: Performed by: INTERNAL MEDICINE

## 2022-11-22 PROCEDURE — 3609010600 HC COLONOSCOPY POLYPECTOMY SNARE/COLD BIOPSY: Performed by: INTERNAL MEDICINE

## 2022-11-22 PROCEDURE — 81025 URINE PREGNANCY TEST: CPT

## 2022-11-22 PROCEDURE — 2580000003 HC RX 258: Performed by: INTERNAL MEDICINE

## 2022-11-22 RX ORDER — SODIUM CHLORIDE 9 MG/ML
25 INJECTION, SOLUTION INTRAVENOUS PRN
Status: DISCONTINUED | OUTPATIENT
Start: 2022-11-22 | End: 2022-11-22 | Stop reason: HOSPADM

## 2022-11-22 RX ORDER — PROPOFOL 10 MG/ML
INJECTION, EMULSION INTRAVENOUS PRN
Status: DISCONTINUED | OUTPATIENT
Start: 2022-11-22 | End: 2022-11-22 | Stop reason: SDUPTHER

## 2022-11-22 RX ORDER — SODIUM CHLORIDE 0.9 % (FLUSH) 0.9 %
5-40 SYRINGE (ML) INJECTION PRN
Status: DISCONTINUED | OUTPATIENT
Start: 2022-11-22 | End: 2022-11-22 | Stop reason: HOSPADM

## 2022-11-22 RX ORDER — SODIUM CHLORIDE 0.9 % (FLUSH) 0.9 %
5-40 SYRINGE (ML) INJECTION EVERY 12 HOURS SCHEDULED
Status: DISCONTINUED | OUTPATIENT
Start: 2022-11-22 | End: 2022-11-22 | Stop reason: HOSPADM

## 2022-11-22 RX ADMIN — PROPOFOL 50 MG: 10 INJECTION, EMULSION INTRAVENOUS at 15:09

## 2022-11-22 RX ADMIN — PROPOFOL 50 MG: 10 INJECTION, EMULSION INTRAVENOUS at 15:05

## 2022-11-22 RX ADMIN — SODIUM CHLORIDE: 9 INJECTION, SOLUTION INTRAVENOUS at 14:49

## 2022-11-22 RX ADMIN — PROPOFOL 50 MG: 10 INJECTION, EMULSION INTRAVENOUS at 15:15

## 2022-11-22 RX ADMIN — PROPOFOL 50 MG: 10 INJECTION, EMULSION INTRAVENOUS at 15:11

## 2022-11-22 RX ADMIN — PROPOFOL 50 MG: 10 INJECTION, EMULSION INTRAVENOUS at 15:06

## 2022-11-22 ASSESSMENT — PAIN - FUNCTIONAL ASSESSMENT: PAIN_FUNCTIONAL_ASSESSMENT: NONE - DENIES PAIN
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
## 2022-11-22 NOTE — POST SEDATION
[address] [address]  Sedation/Analgesia Post Sedation Record    Patient: Talita Cunningham: 2000  Med Rec#: [id number] Acc#: [id number]   Procedure Performed By: Shanta Waters MD  Primary Care Physician: Lennox Hutch, DO    POST-PROCEDURE    Physicians/Assistants: Shanta Waters MD  Procedure Performed:    Sedation/Anesthesia:     Estimated Blood Loss:          ml  Specimens Removed:  []None [x]Other:      Disposition of Specimen:  [x]Pathology []Other      Complications:   [x]None Immediate []Other:     Post-procedure Diagnosis/Findings:             Recommendations:           Crohn,s disease    Shanta Waters MD, MD [facility]  Electronically signed 11/22/2022 at 3:24 PM

## 2022-11-22 NOTE — PROGRESS NOTES
Scope # . Colonoscopy completed, tolerated well. Biopsy taken, 1 jar labeled and sent to lab for processing. Photos taken.

## 2022-11-22 NOTE — PROGRESS NOTES
Recovery mode. Denies discomfort, passing gas, taking fluids. Dr. Shania Donovan discussed findings and plan of care with patient and . Discharge instructions provided, understanding verbalized.

## 2022-11-22 NOTE — ANESTHESIA POSTPROCEDURE EVALUATION
Department of Anesthesiology  Postprocedure Note    Patient: Pasha Coe  MRN: [id number]  YOB: 2000  Date of evaluation: 11/22/2022      Procedure Summary     Date: 11/22/22 Room / Location: [address] [place] [address] / [address] [place] [address]    Anesthesia Start: 8544 Anesthesia Stop: 8331    Procedure: COLONOSCOPY POLYPECTOMY SNARE/COLD BIOPSY Diagnosis:       Diarrhea, unspecified type      (Diarrhea, unspecified type [R19.7])    Surgeons: Laura Patel MD Responsible Provider: Marta Sweeney DO    Anesthesia Type: MAC ASA Status: 2          Anesthesia Type: No value filed.     Guero Phase I: Guero Score: 10    Guero Phase II:        Anesthesia Post Evaluation    Patient location during evaluation: bedside  Patient participation: complete - patient participated  Level of consciousness: awake and alert  Airway patency: patent  Nausea & Vomiting: no nausea and no vomiting  Complications: no  Cardiovascular status: hemodynamically stable  Respiratory status: acceptable, room air and spontaneous ventilation  Hydration status: euvolemic

## 2022-11-22 NOTE — H&P
[address] [address]  Sedation/Analgesia History & Physical    Patient: Pop Motta: 2000  Med Rec#: [id number] Acc#: [id number]   Provider Performing Procedure: Hayley Lopez MD  Primary Care Physician: Michael Cleary DO    PRE-PROCEDURE   Full CODE [x]Yes  DNR-CCA/DNR-CC []Yes   Brief History/Pre-Procedure Diagnosis:crohn,s ds          MEDICAL HISTORY  []CAD/Valve  []Liver Disease  []Lung Disease []Diabetes  []Hypertension []Renal Disease  []Additional information:       has a past medical history of Crohn's colitis ([facility] [address] Environmental allergies, and Pilonidal cyst.    SURGICAL HISTORY   has a past surgical history that includes other surgical history (12/05/2017); Colonoscopy; and Pilonidal cyst excision (N/A, 12/5/2019). Additional information:       ALLERGIES   Allergies as of 10/26/2022 - Fully Reviewed 10/02/2022   Allergen Reaction Noted    Morphine Itching 12/04/2017     Additional information:       MEDICATIONS   Coumadin Use Last 7 Days [x]No []Yes  Antiplatelet drug therapy use last 7 days  [x]No []Yes  Other anticoagulant use last 7 days  [x]No []Yes  No current facility-administered medications for this encounter. Prior to Admission medications    Medication Sig Start Date End Date Taking? Authorizing Provider   predniSONE (DELTASONE) 10 MG tablet Take 4 tablets by mouth once daily for 14 days, then take 3 tabs PO daily for 7 days, then take 2 tabs daily for 7 days, then take 1 tab daily for 7 days, then take 1/2 PO daily for 4 more days , then stop.  10/2/22   HERRERA Danielle   ondansetron (ZOFRAN ODT) 4 MG disintegrating tablet Take 1 tablet by mouth every 8 hours as needed for Nausea 10/2/22   HERRERA Danielle   sertraline (ZOLOFT) 50 MG tablet Take 50 mg by mouth daily    Historical Provider, MD   ketorolac (TORADOL) 10 MG tablet Take 1 tablet by mouth every 6 hours as needed for Pain 4/17/21   Lyndol Clutter Counts, APRN - CNP   ibuprofen (ADVIL;MOTRIN) 600 MG tablet Take 600 mg by mouth every 6 hours as needed for Pain    Historical Provider, MD   inFLIXimab (REMICADE) 100 MG injection Infuse 5 mg/kg intravenously See Admin Instructions Every 3 months    Historical Provider, MD   loratadine-pseudoephedrine (CLARITIN-D 24HR)  MG per extended release tablet Take 1 tablet by mouth daily 7/21/19   Wanda [place], APRN - CNP     Additional information:       PHYSICAL:   Heart:  [x]Regular rate and rhythm  []Other:    Lungs:  [x]Clear    []Other:    Abdomen: [x]Soft    []Other:    Mental Status: [x]Alert & Oriented  []Other:      VITAL SIGNS   No data found. PLANNED PROCEDURE  []EGD  []Colonoscopy []Flex Sigmoid  []ERCP []EUS   []Cystoscopy  [] CATH [] BRONCH   Consent: I have discussed with the patient and/or the patient representative the indication, alternatives, and the possible risks and/or complications of the planned procedure and the anesthesia methods. The patient and/or patient representative appear to understand and agree to proceed. SEDATION  Planned agent:[]Midazolam []Meperidine []Sublimaze []Morphine  []Diazepam [x]Propofol  []Other:     ASA Classification: Class 2 - A normal healthy patient with mild systemic disease    Airway Assessment: normal    Monitoring and Safety: The patient will be placed on a cardiac monitor and vital signs, pulse oximetry and level of consciousness will be continuously evaluated throughout the procedure. The patient will be closely monitored until recovery from the medications is complete and the patient has returned to baseline status. Respiratory therapy will be on standby during the procedure. [x]Pre-procedure diagnostic studies complete and results available. Comment:    [x]Previous sedation/anesthesia experiences assessed. Comment:    [x]The patient is an appropriate candidate to undergo the planned procedure sedation and anesthesia.  (Refer to nursing sedation/analgesia documentation record)  [x]Formulation and discussion of sedation/procedure plan, risks, and expectations with patient and/or responsible adult completed. [x]Patient examined immediately prior to the procedure.  (Refer to nursing sedation/analgesia documentation record)    Jolene Vaughn MD, MD   Electronically signed 11/22/2022 at 2:05 PM

## 2022-11-23 NOTE — OP NOTE
[address] [place] [address]                                OPERATIVE REPORT    PATIENT NAME: Daniel Novak                     :        2000  MED REC NO:   [id number]                           ROOM:  ACCOUNT NO:   [de-identified]                           ADMIT DATE: 2022  PROVIDER:     Chuy De La Garza M.D.    Inderjit Sellersman OF PROCEDURE:  2022    SURGEON:  Chuy De La Garza MD    PROCEDURE:  Colonoscopy plus biopsy. INDICATION FOR THE PROCEDURE:  The patient with a history of Crohn's  disease with a flare-up. The patient had been stable on Remicade for a  little while and the patient had a flare-up. With a course of  prednisone, she was feeling better and she has been switched to [facility]  and had received two doses. The patient has been still having a lot of  symptoms. See the recent office note and preop note for rest of  clinicals. ASA CLASSIFICATION:  II.    MEDICATION:  Per Anesthesia. BIOPSY:  Yes. PHOTOGRAPH:  Yes. DESCRIPTION OF PROCEDURE:  Informed consent was obtained after  explaining risks and benefits of the procedure and conscious sedation. Possible complications including bleeding, perforation, reaction to  medicine, but not limiting to death, were discussed. A PCF-180 colonoscope was advanced to the cecum, and the patient having  very distorted cecum with ulcerated ileocecal valve and the scope could  not be entered, but I could take little biopsies there where it was all  ulcerated and narrowed out. The scope was withdrawn. Mucosa of the  colon looks healthy. See photographs. No palpable mass seen in the  cecum, ascending colon, transverse colon, descending colon, sigmoid, and  rectum. Mucosa looked healthy. Tolerated the procedure well. IMPRESSION:  Ileocecal valve indeterminate, ileums having ulcerated  stricture.     RECOMMENDATIONS:  Our best hope is that with Entyvio, this ulceration  and

## 2022-12-11 NOTE — ED NOTES
Patient to restroom to obtain urine sample.       Susa Holter Dwenger, RN  04/17/21 6120 No pallor, no cervical/supraclavicular/inguinal adenopathy.  No splenomegaly

## 2022-12-28 ENCOUNTER — OFFICE VISIT (OUTPATIENT)
Dept: SURGERY | Age: 22
End: 2022-12-28

## 2022-12-28 VITALS
WEIGHT: 160 LBS | DIASTOLIC BLOOD PRESSURE: 80 MMHG | BODY MASS INDEX: 30.21 KG/M2 | RESPIRATION RATE: 18 BRPM | OXYGEN SATURATION: 98 % | TEMPERATURE: 97.8 F | SYSTOLIC BLOOD PRESSURE: 120 MMHG | HEART RATE: 118 BPM | HEIGHT: 61 IN

## 2022-12-28 DIAGNOSIS — K56.699 COLON STRICTURE (HCC): Primary | ICD-10-CM

## 2022-12-28 DIAGNOSIS — K50.919 CROHN'S DISEASE WITH COMPLICATION, UNSPECIFIED GASTROINTESTINAL TRACT LOCATION (HCC): ICD-10-CM

## 2023-01-01 ASSESSMENT — ENCOUNTER SYMPTOMS
SHORTNESS OF BREATH: 0
CONSTIPATION: 0
RHINORRHEA: 0
VOMITING: 0
EYE PAIN: 0
ALLERGIC/IMMUNOLOGIC NEGATIVE: 1
STRIDOR: 0
CHEST TIGHTNESS: 0
EYE REDNESS: 0
SINUS PRESSURE: 0
PHOTOPHOBIA: 0
DIARRHEA: 0
APNEA: 0
COLOR CHANGE: 0
ABDOMINAL DISTENTION: 1
BLOOD IN STOOL: 0
WHEEZING: 0
SORE THROAT: 0
RECTAL PAIN: 0
COUGH: 0
BACK PAIN: 0
EYE ITCHING: 0
EYE DISCHARGE: 0
FACIAL SWELLING: 0
ABDOMINAL PAIN: 1
CHOKING: 0
TROUBLE SWALLOWING: 0
NAUSEA: 0
ANAL BLEEDING: 0
VOICE CHANGE: 0

## 2023-01-13 ENCOUNTER — TELEPHONE (OUTPATIENT)
Dept: SURGERY | Age: 23
End: 2023-01-13

## 2023-01-13 NOTE — TELEPHONE ENCOUNTER
Prior Authorization request has been successfully submitted. If clinical information to support this request has not been submitted, please send (via e-mail, fax or telephone) clinical review to the Medical Management Department within one business day. Your reference ID for this submission request is: [id number]    An authorization or notification is not a guarantee of payment, but is based on medical necessity, appropriate coding and benefits. Benefits may be subject to limitation and / or qualifications and will be determined when the claim is received for processing. To submit another prior authorization request please return to the top of the page and enter the [facility] ID, Medicaid ID or Member info.       Member Info   Member ID:  [id number]  Member Name:  Sharmila Velásquez  Member :  2000  Reference #: [id number]  Reference #: [id number]  Description: Inpatient Elective  Place Of Service: [facility] Provider: Natalia Coyle  Requesting/Ordering Provider: Natalia Coyle, Physician/Medical Doctor (MD) P  Servicing/Rendering Provider:   Facility: [address] [place] [address], [address] [place] [address]  Member Information  Member Name: Gayle Cintron Id: [id number]  YOB: 2000  Gender: Female  Admission Event  Diagnosis Code: K87.088 Other intestinal obstruction unspecified as to partial versus complete obstruction; K50.919 Crohn's disease, unspecified, with unspecified complications  Procedure: 25793 Laparoscopy, surgical; colectomy, partial, with removal of terminal ileum with ileocolostomy  Line #1  Requested Received Date: 2023 3:43:00 PM Requested Days: 3  Start Date of Service: 2023 Authorized Days: 3  End Date of Service: 2/10/2023 Status: Approved

## 2023-01-24 ENCOUNTER — PREP FOR PROCEDURE (OUTPATIENT)
Dept: SURGERY | Age: 23
End: 2023-01-24

## 2023-01-24 DIAGNOSIS — K56.699 COLON STRICTURE (HCC): Primary | ICD-10-CM

## 2023-01-24 RX ORDER — METRONIDAZOLE 500 MG/1
TABLET ORAL
Qty: 3 TABLET | Refills: 0 | Status: ON HOLD | OUTPATIENT
Start: 2023-01-24 | End: 2023-02-10 | Stop reason: HOSPADM

## 2023-01-24 RX ORDER — ALVIMOPAN 12 MG/1
12 CAPSULE ORAL 2 TIMES DAILY
Status: CANCELLED | OUTPATIENT
Start: 2023-01-24 | End: 2023-01-31

## 2023-01-24 RX ORDER — ENOXAPARIN SODIUM 100 MG/ML
40 INJECTION SUBCUTANEOUS DAILY
Status: CANCELLED | OUTPATIENT
Start: 2023-01-24

## 2023-01-24 RX ORDER — ALVIMOPAN 12 MG/1
12 CAPSULE ORAL ONCE
Status: CANCELLED | OUTPATIENT
Start: 2023-01-24 | End: 2023-01-24

## 2023-01-24 RX ORDER — ONDANSETRON 2 MG/ML
4 INJECTION INTRAMUSCULAR; INTRAVENOUS EVERY 4 HOURS PRN
Status: CANCELLED | OUTPATIENT
Start: 2023-01-24

## 2023-01-24 RX ORDER — SODIUM CHLORIDE 9 MG/ML
INJECTION, SOLUTION INTRAVENOUS CONTINUOUS
Status: CANCELLED | OUTPATIENT
Start: 2023-01-24
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
## 2023-02-07 ENCOUNTER — ANESTHESIA (OUTPATIENT)
Dept: OPERATING ROOM | Age: 23
End: 2023-02-07
Payer: COMMERCIAL

## 2023-02-07 ENCOUNTER — HOSPITAL ENCOUNTER (INPATIENT)
Age: 23
LOS: 4 days | Discharge: HOME | End: 2023-02-11
Attending: SURGERY | Admitting: SURGERY
Payer: COMMERCIAL

## 2023-02-07 ENCOUNTER — ANESTHESIA EVENT (OUTPATIENT)
Dept: OPERATING ROOM | Age: 23
End: 2023-02-07
Payer: COMMERCIAL

## 2023-02-07 DIAGNOSIS — Z90.49 S/P PARTIAL RESECTION OF COLON: Primary | ICD-10-CM

## 2023-02-07 DIAGNOSIS — K50.919 CROHN'S DISEASE WITH COMPLICATION, UNSPECIFIED GASTROINTESTINAL TRACT LOCATION (HCC): ICD-10-CM

## 2023-02-07 PROBLEM — K50.912 ACUTE CROHN'S DISEASE WITH INTESTINAL OBSTRUCTION (HCC): Status: ACTIVE | Noted: 2023-02-07

## 2023-02-07 LAB
ABO GROUP BLD: NORMAL
ANION GAP SERPL CALC-SCNC: 16 MEQ/L (ref 8–16)
BUN SERPL-MCNC: 7 MG/DL (ref 7–22)
CALCIUM SERPL-MCNC: 8.1 MG/DL (ref 8.5–10.5)
CHLORIDE SERPL-SCNC: 104 MEQ/L (ref 98–111)
CO2 SERPL-SCNC: 18 MEQ/L (ref 23–33)
CREAT SERPL-MCNC: 0.6 MG/DL (ref 0.4–1.2)
GFR SERPL CREATININE-BSD FRML MDRD: > 60 ML/MIN/1.73M2
GLUCOSE SERPL-MCNC: 168 MG/DL (ref 70–108)
IAT IGG-SP REAG SERPL QL: NORMAL
MAGNESIUM SERPL-MCNC: 1.7 MG/DL (ref 1.6–2.4)
POTASSIUM SERPL-SCNC: 3.5 MEQ/L (ref 3.5–5.2)
PREGNANCY, URINE: NEGATIVE
RH BLD: NORMAL
SODIUM SERPL-SCNC: 138 MEQ/L (ref 135–145)

## 2023-02-07 PROCEDURE — 7100000001 HC PACU RECOVERY - ADDTL 15 MIN: Performed by: SURGERY

## 2023-02-07 PROCEDURE — 3600000009 HC SURGERY ROBOT BASE: Performed by: SURGERY

## 2023-02-07 PROCEDURE — 86900 BLOOD TYPING SEROLOGIC ABO: CPT

## 2023-02-07 PROCEDURE — 3700000001 HC ADD 15 MINUTES (ANESTHESIA): Performed by: SURGERY

## 2023-02-07 PROCEDURE — 6360000002 HC RX W HCPCS: Performed by: NURSE ANESTHETIST, CERTIFIED REGISTERED

## 2023-02-07 PROCEDURE — 6360000002 HC RX W HCPCS: Performed by: SURGERY

## 2023-02-07 PROCEDURE — 44204 LAPARO PARTIAL COLECTOMY: CPT | Performed by: SURGERY

## 2023-02-07 PROCEDURE — 6370000000 HC RX 637 (ALT 250 FOR IP): Performed by: SURGERY

## 2023-02-07 PROCEDURE — 3600000019 HC SURGERY ROBOT ADDTL 15MIN: Performed by: SURGERY

## 2023-02-07 PROCEDURE — 88307 TISSUE EXAM BY PATHOLOGIST: CPT

## 2023-02-07 PROCEDURE — 83735 ASSAY OF MAGNESIUM: CPT

## 2023-02-07 PROCEDURE — 2580000003 HC RX 258

## 2023-02-07 PROCEDURE — 7100000000 HC PACU RECOVERY - FIRST 15 MIN: Performed by: SURGERY

## 2023-02-07 PROCEDURE — 6370000000 HC RX 637 (ALT 250 FOR IP): Performed by: ANESTHESIOLOGY

## 2023-02-07 PROCEDURE — 2500000003 HC RX 250 WO HCPCS: Performed by: SURGERY

## 2023-02-07 PROCEDURE — 6360000002 HC RX W HCPCS: Performed by: ANESTHESIOLOGY

## 2023-02-07 PROCEDURE — 2709999900 HC NON-CHARGEABLE SUPPLY: Performed by: SURGERY

## 2023-02-07 PROCEDURE — 80048 BASIC METABOLIC PNL TOTAL CA: CPT

## 2023-02-07 PROCEDURE — 2580000003 HC RX 258: Performed by: SURGERY

## 2023-02-07 PROCEDURE — 2500000003 HC RX 250 WO HCPCS: Performed by: NURSE ANESTHETIST, CERTIFIED REGISTERED

## 2023-02-07 PROCEDURE — 36415 COLL VENOUS BLD VENIPUNCTURE: CPT

## 2023-02-07 PROCEDURE — 0DTH4ZZ RESECTION OF CECUM, PERCUTANEOUS ENDOSCOPIC APPROACH: ICD-10-PCS | Performed by: SURGERY

## 2023-02-07 PROCEDURE — 86901 BLOOD TYPING SEROLOGIC RH(D): CPT

## 2023-02-07 PROCEDURE — 8E0W4CZ ROBOTIC ASSISTED PROCEDURE OF TRUNK REGION, PERCUTANEOUS ENDOSCOPIC APPROACH: ICD-10-PCS | Performed by: SURGERY

## 2023-02-07 PROCEDURE — 2720000010 HC SURG SUPPLY STERILE: Performed by: SURGERY

## 2023-02-07 PROCEDURE — 6360000002 HC RX W HCPCS

## 2023-02-07 PROCEDURE — 6360000002 HC RX W HCPCS: Performed by: NURSE PRACTITIONER

## 2023-02-07 PROCEDURE — 1200000000 HC SEMI PRIVATE

## 2023-02-07 PROCEDURE — 86885 COOMBS TEST INDIRECT QUAL: CPT

## 2023-02-07 PROCEDURE — 3700000000 HC ANESTHESIA ATTENDED CARE: Performed by: SURGERY

## 2023-02-07 PROCEDURE — S2900 ROBOTIC SURGICAL SYSTEM: HCPCS | Performed by: SURGERY

## 2023-02-07 PROCEDURE — 81025 URINE PREGNANCY TEST: CPT

## 2023-02-07 RX ORDER — SODIUM CHLORIDE 0.9 % (FLUSH) 0.9 %
5-40 SYRINGE (ML) INJECTION PRN
Status: DISCONTINUED | OUTPATIENT
Start: 2023-02-07 | End: 2023-02-11 | Stop reason: HOSPADM

## 2023-02-07 RX ORDER — FLUCONAZOLE, SODIUM CHLORIDE 2 MG/ML
100 INJECTION INTRAVENOUS EVERY 24 HOURS
Status: DISCONTINUED | OUTPATIENT
Start: 2023-02-07 | End: 2023-02-11 | Stop reason: HOSPADM

## 2023-02-07 RX ORDER — ALVIMOPAN 12 MG/1
12 CAPSULE ORAL ONCE
Status: DISCONTINUED | OUTPATIENT
Start: 2023-02-07 | End: 2023-02-07 | Stop reason: CLARIF

## 2023-02-07 RX ORDER — SCOLOPAMINE TRANSDERMAL SYSTEM 1 MG/1
1 PATCH, EXTENDED RELEASE TRANSDERMAL ONCE
Status: COMPLETED | OUTPATIENT
Start: 2023-02-07 | End: 2023-02-10

## 2023-02-07 RX ORDER — BUPIVACAINE HYDROCHLORIDE 5 MG/ML
INJECTION, SOLUTION EPIDURAL; INTRACAUDAL PRN
Status: DISCONTINUED | OUTPATIENT
Start: 2023-02-07 | End: 2023-02-07 | Stop reason: ALTCHOICE

## 2023-02-07 RX ORDER — SODIUM CHLORIDE 0.9 % (FLUSH) 0.9 %
5-40 SYRINGE (ML) INJECTION EVERY 12 HOURS SCHEDULED
Status: DISCONTINUED | OUTPATIENT
Start: 2023-02-07 | End: 2023-02-11 | Stop reason: HOSPADM

## 2023-02-07 RX ORDER — SODIUM CHLORIDE 0.9 % (FLUSH) 0.9 %
5-40 SYRINGE (ML) INJECTION EVERY 12 HOURS SCHEDULED
Status: DISCONTINUED | OUTPATIENT
Start: 2023-02-07 | End: 2023-02-07 | Stop reason: HOSPADM

## 2023-02-07 RX ORDER — ENOXAPARIN SODIUM 100 MG/ML
40 INJECTION SUBCUTANEOUS DAILY
Status: DISCONTINUED | OUTPATIENT
Start: 2023-02-07 | End: 2023-02-07 | Stop reason: SDUPTHER

## 2023-02-07 RX ORDER — HYDRALAZINE HYDROCHLORIDE 20 MG/ML
10 INJECTION INTRAMUSCULAR; INTRAVENOUS
Status: DISCONTINUED | OUTPATIENT
Start: 2023-02-07 | End: 2023-02-07 | Stop reason: HOSPADM

## 2023-02-07 RX ORDER — FENTANYL CITRATE 50 UG/ML
50 INJECTION, SOLUTION INTRAMUSCULAR; INTRAVENOUS EVERY 5 MIN PRN
Status: COMPLETED | OUTPATIENT
Start: 2023-02-07 | End: 2023-02-07

## 2023-02-07 RX ORDER — PROPOFOL 10 MG/ML
INJECTION, EMULSION INTRAVENOUS PRN
Status: DISCONTINUED | OUTPATIENT
Start: 2023-02-07 | End: 2023-02-07 | Stop reason: SDUPTHER

## 2023-02-07 RX ORDER — ONDANSETRON 4 MG/1
4 TABLET, ORALLY DISINTEGRATING ORAL EVERY 8 HOURS PRN
Status: DISCONTINUED | OUTPATIENT
Start: 2023-02-07 | End: 2023-02-11 | Stop reason: HOSPADM

## 2023-02-07 RX ORDER — HYDROCODONE BITARTRATE AND ACETAMINOPHEN 5; 325 MG/1; MG/1
1 TABLET ORAL EVERY 4 HOURS PRN
Status: DISCONTINUED | OUTPATIENT
Start: 2023-02-07 | End: 2023-02-11 | Stop reason: HOSPADM

## 2023-02-07 RX ORDER — HYOSCYAMINE SULFATE 0.125 MG
125 TABLET,DISINTEGRATING ORAL EVERY 4 HOURS PRN
Status: DISCONTINUED | OUTPATIENT
Start: 2023-02-07 | End: 2023-02-11 | Stop reason: HOSPADM

## 2023-02-07 RX ORDER — SODIUM CHLORIDE 9 MG/ML
INJECTION, SOLUTION INTRAVENOUS PRN
Status: DISCONTINUED | OUTPATIENT
Start: 2023-02-07 | End: 2023-02-07 | Stop reason: HOSPADM

## 2023-02-07 RX ORDER — SODIUM CHLORIDE 9 MG/ML
INJECTION, SOLUTION INTRAVENOUS PRN
Status: DISCONTINUED | OUTPATIENT
Start: 2023-02-07 | End: 2023-02-11 | Stop reason: HOSPADM

## 2023-02-07 RX ORDER — METRONIDAZOLE 500 MG/100ML
INJECTION, SOLUTION INTRAVENOUS PRN
Status: DISCONTINUED | OUTPATIENT
Start: 2023-02-07 | End: 2023-02-07 | Stop reason: SDUPTHER

## 2023-02-07 RX ORDER — ONDANSETRON 2 MG/ML
4 INJECTION INTRAMUSCULAR; INTRAVENOUS EVERY 6 HOURS PRN
Status: DISCONTINUED | OUTPATIENT
Start: 2023-02-07 | End: 2023-02-11 | Stop reason: HOSPADM

## 2023-02-07 RX ORDER — INDOCYANINE GREEN AND WATER 25 MG
KIT INJECTION PRN
Status: DISCONTINUED | OUTPATIENT
Start: 2023-02-07 | End: 2023-02-07 | Stop reason: SDUPTHER

## 2023-02-07 RX ORDER — MIDAZOLAM HYDROCHLORIDE 1 MG/ML
INJECTION INTRAMUSCULAR; INTRAVENOUS PRN
Status: DISCONTINUED | OUTPATIENT
Start: 2023-02-07 | End: 2023-02-07 | Stop reason: SDUPTHER

## 2023-02-07 RX ORDER — FENTANYL CITRATE 50 UG/ML
INJECTION, SOLUTION INTRAMUSCULAR; INTRAVENOUS PRN
Status: DISCONTINUED | OUTPATIENT
Start: 2023-02-07 | End: 2023-02-07 | Stop reason: SDUPTHER

## 2023-02-07 RX ORDER — SODIUM CHLORIDE 9 MG/ML
INJECTION, SOLUTION INTRAVENOUS CONTINUOUS
Status: DISCONTINUED | OUTPATIENT
Start: 2023-02-07 | End: 2023-02-07 | Stop reason: HOSPADM

## 2023-02-07 RX ORDER — ONDANSETRON 2 MG/ML
INJECTION INTRAMUSCULAR; INTRAVENOUS PRN
Status: DISCONTINUED | OUTPATIENT
Start: 2023-02-07 | End: 2023-02-07 | Stop reason: SDUPTHER

## 2023-02-07 RX ORDER — SODIUM CHLORIDE 9 MG/ML
INJECTION, SOLUTION INTRAVENOUS CONTINUOUS
Status: DISCONTINUED | OUTPATIENT
Start: 2023-02-07 | End: 2023-02-08

## 2023-02-07 RX ORDER — SUCCINYLCHOLINE/SOD CL,ISO/PF 200MG/10ML
SYRINGE (ML) INTRAVENOUS PRN
Status: DISCONTINUED | OUTPATIENT
Start: 2023-02-07 | End: 2023-02-07 | Stop reason: SDUPTHER

## 2023-02-07 RX ORDER — ROCURONIUM BROMIDE 10 MG/ML
INJECTION, SOLUTION INTRAVENOUS PRN
Status: DISCONTINUED | OUTPATIENT
Start: 2023-02-07 | End: 2023-02-07 | Stop reason: SDUPTHER

## 2023-02-07 RX ORDER — ONDANSETRON 2 MG/ML
4 INJECTION INTRAMUSCULAR; INTRAVENOUS EVERY 4 HOURS PRN
Status: DISCONTINUED | OUTPATIENT
Start: 2023-02-07 | End: 2023-02-07 | Stop reason: SDUPTHER

## 2023-02-07 RX ORDER — HYDROCODONE BITARTRATE AND ACETAMINOPHEN 5; 325 MG/1; MG/1
2 TABLET ORAL EVERY 4 HOURS PRN
Status: DISCONTINUED | OUTPATIENT
Start: 2023-02-07 | End: 2023-02-11 | Stop reason: HOSPADM

## 2023-02-07 RX ORDER — DEXAMETHASONE SODIUM PHOSPHATE 10 MG/ML
INJECTION, EMULSION INTRAMUSCULAR; INTRAVENOUS PRN
Status: DISCONTINUED | OUTPATIENT
Start: 2023-02-07 | End: 2023-02-07 | Stop reason: SDUPTHER

## 2023-02-07 RX ORDER — ALVIMOPAN 12 MG/1
12 CAPSULE ORAL 2 TIMES DAILY
Status: DISCONTINUED | OUTPATIENT
Start: 2023-02-07 | End: 2023-02-07 | Stop reason: CLARIF

## 2023-02-07 RX ORDER — SODIUM CHLORIDE 0.9 % (FLUSH) 0.9 %
5-40 SYRINGE (ML) INJECTION PRN
Status: DISCONTINUED | OUTPATIENT
Start: 2023-02-07 | End: 2023-02-07 | Stop reason: HOSPADM

## 2023-02-07 RX ORDER — FENTANYL CITRATE 50 UG/ML
INJECTION, SOLUTION INTRAMUSCULAR; INTRAVENOUS
Status: COMPLETED
Start: 2023-02-07 | End: 2023-02-07

## 2023-02-07 RX ORDER — ENOXAPARIN SODIUM 100 MG/ML
40 INJECTION SUBCUTANEOUS DAILY
Status: DISCONTINUED | OUTPATIENT
Start: 2023-02-08 | End: 2023-02-11 | Stop reason: HOSPADM

## 2023-02-07 RX ADMIN — HYDROMORPHONE HYDROCHLORIDE 1 MG: 1 INJECTION, SOLUTION INTRAMUSCULAR; INTRAVENOUS; SUBCUTANEOUS at 19:46

## 2023-02-07 RX ADMIN — NALOXEGOL OXALATE 25 MG: 25 TABLET, FILM COATED ORAL at 08:56

## 2023-02-07 RX ADMIN — FENTANYL CITRATE 50 MCG: 50 INJECTION, SOLUTION INTRAMUSCULAR; INTRAVENOUS at 12:03

## 2023-02-07 RX ADMIN — CEFOXITIN 2000 MG: 2 INJECTION, POWDER, FOR SOLUTION INTRAVENOUS at 09:31

## 2023-02-07 RX ADMIN — FENTANYL CITRATE 50 MCG: 50 INJECTION INTRAMUSCULAR; INTRAVENOUS at 09:20

## 2023-02-07 RX ADMIN — MIDAZOLAM 2 MG: 1 INJECTION INTRAMUSCULAR; INTRAVENOUS at 09:19

## 2023-02-07 RX ADMIN — FENTANYL CITRATE 50 MCG: 50 INJECTION INTRAMUSCULAR; INTRAVENOUS at 10:16

## 2023-02-07 RX ADMIN — HYDROMORPHONE HYDROCHLORIDE 1 MG: 1 INJECTION, SOLUTION INTRAMUSCULAR; INTRAVENOUS; SUBCUTANEOUS at 21:51

## 2023-02-07 RX ADMIN — SUGAMMADEX 200 MG: 100 INJECTION, SOLUTION INTRAVENOUS at 11:36

## 2023-02-07 RX ADMIN — SODIUM CHLORIDE: 9 INJECTION, SOLUTION INTRAVENOUS at 08:52

## 2023-02-07 RX ADMIN — HYDROMORPHONE HYDROCHLORIDE 0.5 MG: 1 INJECTION, SOLUTION INTRAMUSCULAR; INTRAVENOUS; SUBCUTANEOUS at 13:34

## 2023-02-07 RX ADMIN — HYDROCODONE BITARTRATE AND ACETAMINOPHEN 2 TABLET: 5; 325 TABLET ORAL at 15:12

## 2023-02-07 RX ADMIN — FENTANYL CITRATE 100 MCG: 50 INJECTION INTRAMUSCULAR; INTRAVENOUS at 09:38

## 2023-02-07 RX ADMIN — PIPERACILLIN AND TAZOBACTAM 3375 MG: 3; .375 INJECTION, POWDER, FOR SOLUTION INTRAVENOUS at 22:46

## 2023-02-07 RX ADMIN — SODIUM CHLORIDE: 9 INJECTION, SOLUTION INTRAVENOUS at 11:02

## 2023-02-07 RX ADMIN — ROCURONIUM BROMIDE 30 MG: 50 INJECTION INTRAVENOUS at 10:42

## 2023-02-07 RX ADMIN — Medication 160 MG: at 09:23

## 2023-02-07 RX ADMIN — HYDROMORPHONE HYDROCHLORIDE 0.5 MG: 1 INJECTION, SOLUTION INTRAMUSCULAR; INTRAVENOUS; SUBCUTANEOUS at 12:30

## 2023-02-07 RX ADMIN — SODIUM CHLORIDE: 9 INJECTION, SOLUTION INTRAVENOUS at 08:51

## 2023-02-07 RX ADMIN — HYOSCYAMINE SULFATE 125 MCG: 0.12 TABLET, ORALLY DISINTEGRATING ORAL at 15:38

## 2023-02-07 RX ADMIN — HYDROMORPHONE HYDROCHLORIDE 0.5 MG: 1 INJECTION, SOLUTION INTRAMUSCULAR; INTRAVENOUS; SUBCUTANEOUS at 12:15

## 2023-02-07 RX ADMIN — SODIUM CHLORIDE: 9 INJECTION, SOLUTION INTRAVENOUS at 14:24

## 2023-02-07 RX ADMIN — METRONIDAZOLE 500 MG: 500 INJECTION, SOLUTION INTRAVENOUS at 10:20

## 2023-02-07 RX ADMIN — DEXAMETHASONE SODIUM PHOSPHATE 10 MG: 10 INJECTION, EMULSION INTRAMUSCULAR; INTRAVENOUS at 09:49

## 2023-02-07 RX ADMIN — HYDROMORPHONE HYDROCHLORIDE 0.5 MG: 1 INJECTION, SOLUTION INTRAMUSCULAR; INTRAVENOUS; SUBCUTANEOUS at 12:25

## 2023-02-07 RX ADMIN — PROPOFOL 200 MG: 10 INJECTION, EMULSION INTRAVENOUS at 09:23

## 2023-02-07 RX ADMIN — HYDROMORPHONE HYDROCHLORIDE 0.5 MG: 1 INJECTION, SOLUTION INTRAMUSCULAR; INTRAVENOUS; SUBCUTANEOUS at 12:20

## 2023-02-07 RX ADMIN — CEFOXITIN 2000 MG: 2 INJECTION, POWDER, FOR SOLUTION INTRAVENOUS at 11:36

## 2023-02-07 RX ADMIN — INDOCYANINE GREEN 4 MG: 25 INJECTION, POWDER, LYOPHILIZED, FOR SOLUTION INTRAVENOUS at 10:47

## 2023-02-07 RX ADMIN — Medication 80 MG: at 09:22

## 2023-02-07 RX ADMIN — HYDROMORPHONE HYDROCHLORIDE 0.5 MG: 1 INJECTION, SOLUTION INTRAMUSCULAR; INTRAVENOUS; SUBCUTANEOUS at 16:21

## 2023-02-07 RX ADMIN — FLUCONAZOLE, SODIUM CHLORIDE 100 MG: 2 INJECTION INTRAVENOUS at 18:33

## 2023-02-07 RX ADMIN — ROCURONIUM BROMIDE 30 MG: 50 INJECTION INTRAVENOUS at 09:44

## 2023-02-07 RX ADMIN — FENTANYL CITRATE 50 MCG: 50 INJECTION INTRAMUSCULAR; INTRAVENOUS at 09:27

## 2023-02-07 RX ADMIN — PIPERACILLIN AND TAZOBACTAM 4500 MG: 4; .5 INJECTION, POWDER, FOR SOLUTION INTRAVENOUS at 16:07

## 2023-02-07 RX ADMIN — FENTANYL CITRATE 50 MCG: 50 INJECTION INTRAMUSCULAR; INTRAVENOUS at 11:19

## 2023-02-07 RX ADMIN — ONDANSETRON 4 MG: 2 INJECTION INTRAMUSCULAR; INTRAVENOUS at 10:46

## 2023-02-07 RX ADMIN — FENTANYL CITRATE 50 MCG: 50 INJECTION, SOLUTION INTRAMUSCULAR; INTRAVENOUS at 12:08

## 2023-02-07 RX ADMIN — SODIUM CHLORIDE: 9 INJECTION, SOLUTION INTRAVENOUS at 09:59

## 2023-02-07 RX ADMIN — FENTANYL CITRATE 50 MCG: 50 INJECTION INTRAMUSCULAR; INTRAVENOUS at 11:42

## 2023-02-07 ASSESSMENT — PAIN SCALES - GENERAL
PAINLEVEL_OUTOF10: 9
PAINLEVEL_OUTOF10: 10
PAINLEVEL_OUTOF10: 2
PAINLEVEL_OUTOF10: 9
PAINLEVEL_OUTOF10: 6
PAINLEVEL_OUTOF10: 10
PAINLEVEL_OUTOF10: 6
PAINLEVEL_OUTOF10: 9
PAINLEVEL_OUTOF10: 10
PAINLEVEL_OUTOF10: 10
PAINLEVEL_OUTOF10: 7
PAINLEVEL_OUTOF10: 9
PAINLEVEL_OUTOF10: 8
PAINLEVEL_OUTOF10: 9
PAINLEVEL_OUTOF10: 9
PAINLEVEL_OUTOF10: 7
PAINLEVEL_OUTOF10: 9
PAINLEVEL_OUTOF10: 10
PAINLEVEL_OUTOF10: 9

## 2023-02-07 ASSESSMENT — PAIN DESCRIPTION - ORIENTATION
ORIENTATION: MID

## 2023-02-07 ASSESSMENT — PAIN DESCRIPTION - LOCATION
LOCATION: ABDOMEN

## 2023-02-07 ASSESSMENT — PAIN - FUNCTIONAL ASSESSMENT
PAIN_FUNCTIONAL_ASSESSMENT: PREVENTS OR INTERFERES SOME ACTIVE ACTIVITIES AND ADLS
PAIN_FUNCTIONAL_ASSESSMENT: 0-10

## 2023-02-07 ASSESSMENT — PAIN DESCRIPTION - DESCRIPTORS
DESCRIPTORS: ACHING
DESCRIPTORS: ACHING;CRAMPING;TINGLING
DESCRIPTORS: ACHING
DESCRIPTORS: ACHING;CRAMPING;STABBING
DESCRIPTORS: ACHING;CRAMPING;TIGHTNESS
DESCRIPTORS: ACHING;DISCOMFORT;SHOOTING;SPASM

## 2023-02-07 ASSESSMENT — PAIN DESCRIPTION - FREQUENCY: FREQUENCY: CONTINUOUS

## 2023-02-07 ASSESSMENT — PAIN DESCRIPTION - PAIN TYPE: TYPE: SURGICAL PAIN

## 2023-02-07 NOTE — INTERVAL H&P NOTE
Update History & Physical    The patient's History and Physical was reviewed with the patient and I examined the patient. There was no change. The surgical site was confirmed by the patient and me. Plan: The risks, benefits, expected outcome, and alternative to the recommended procedure have been discussed with the patient. Patient understands and wants to proceed with the procedure.      Electronically signed by Erwin Luong MD on 2/7/2023 at 8:41 AM
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
## 2023-02-07 NOTE — ANESTHESIA POSTPROCEDURE EVALUATION
Department of Anesthesiology  Postprocedure Note    Patient: Ninoska Adams  MRN: [id number]  YOB: 2000  Date of evaluation: 2/7/2023      Procedure Summary     Date: 02/07/23 Room / Location: [address] [place] [address] / [facility]    Anesthesia Start: 2479 Anesthesia Stop: 6398    Procedure: Robotic Ileocecetomy (Abdomen) Diagnosis:       Crohn's disease with complication, unspecified gastrointestinal tract location ([address]      (Crohn's disease with complication, unspecified gastrointestinal tract location ([address] [K50.919])    Surgeons: Miko Green MD Responsible Provider: Sher Ayala MD    Anesthesia Type: general ASA Status: 2          Anesthesia Type: No value filed.     Guero Phase I: Guero Score: 9    Guero Phase II:        Anesthesia Post Evaluation    Patient location during evaluation: PACU  Patient participation: complete - patient participated  Level of consciousness: awake  Airway patency: patent  Nausea & Vomiting: no vomiting and no nausea  Complications: no  Cardiovascular status: hemodynamically stable  Respiratory status: acceptable and nasal cannula  Hydration status: stable

## 2023-02-07 NOTE — PROGRESS NOTES
1157: Pt arrives to pacu, awakens to verbal stimuli. Pt on room, respirations easy and unlabored. VSS  1203: Pt states pain 10/10, 50mcg of fentanyl administered  1208: No change in pain, 50mcg of fentanyl given  1215: No change in pain, 0.5mg of dilaudid administered  1220: Pt states pain 9/10, 0.5mg of dilaudid given  1225: No change in pain, 0.5mg of dilaudid administered  1230: No Change, 0.5mg of dilaudid given  1235: Report provided to [address] [place] [address]  1240: Pt sleeping, easily awakens to voice. Continues to c/o pain, states she wants to go to room to see mom. Mother upstairs awaiting patient in her room. 1250: Pt meets criteria for discharge from pacu, awaiting transport  1256: Pt transported to  in stable condition.  VSS

## 2023-02-07 NOTE — BRIEF OP NOTE
Brief Postoperative Note      Patient: Alex Buchanan  YOB: 2000  MRN: [id number]    Date of Procedure: 2/7/2023    Pre-Op Diagnosis: [facility]'s disease with complication, unspecified [facility] [K50.919]    Post-Op Diagnosis: Same       Procedure(s):  Robotic Ileocecetomy    Surgeon(s):  Abby Sorenosn MD    Assistant:  First Assistant: Rannie Dubin, RN    Anesthesia: General/local    Estimated Blood Loss (mL): 26YD    Complications: None    Specimens:   ID Type Source Tests Collected by Time Destination   A : ILEOCECUM Tissue Colon SURGICAL PATHOLOGY Abby Sorenson MD 2/7/2023 1118        Implants:  * No implants in log *      Drains:   Closed/Suction Drain RLQ (Active)       Findings: as above - see op note for details    Electronically signed by Abby Sorenson MD on 2/7/2023 at 11:36 AM

## 2023-02-07 NOTE — PROGRESS NOTES
Pt and family oriented to SDS 15 and unit. Pt verbalized approval for first name, last initial and physician name on unit whiteboard. Fall band applied. Vaccine information given. Plan of care reviewed.

## 2023-02-08 LAB
ANION GAP SERPL CALC-SCNC: 12 MEQ/L (ref 8–16)
BUN SERPL-MCNC: 5 MG/DL (ref 7–22)
CALCIUM SERPL-MCNC: 9.1 MG/DL (ref 8.5–10.5)
CHLORIDE SERPL-SCNC: 102 MEQ/L (ref 98–111)
CO2 SERPL-SCNC: 23 MEQ/L (ref 23–33)
CREAT SERPL-MCNC: 0.6 MG/DL (ref 0.4–1.2)
DEPRECATED RDW RBC AUTO: 42.9 FL (ref 35–45)
ERYTHROCYTE [DISTWIDTH] IN BLOOD BY AUTOMATED COUNT: 15.4 % (ref 11.5–14.5)
GFR SERPL CREATININE-BSD FRML MDRD: > 60 ML/MIN/1.73M2
GLUCOSE SERPL-MCNC: 102 MG/DL (ref 70–108)
HCT VFR BLD AUTO: 35.3 % (ref 37–47)
HGB BLD-MCNC: 10.4 GM/DL (ref 12–16)
MCH RBC QN AUTO: 22.8 PG (ref 26–33)
MCHC RBC AUTO-ENTMCNC: 29.5 GM/DL (ref 32.2–35.5)
MCV RBC AUTO: 77.4 FL (ref 81–99)
PLATELET # BLD AUTO: 347 THOU/MM3 (ref 130–400)
PMV BLD AUTO: 9.1 FL (ref 9.4–12.4)
POTASSIUM SERPL-SCNC: 4.1 MEQ/L (ref 3.5–5.2)
RBC # BLD AUTO: 4.56 MILL/MM3 (ref 4.2–5.4)
SODIUM SERPL-SCNC: 137 MEQ/L (ref 135–145)
WBC # BLD AUTO: 14.6 THOU/MM3 (ref 4.8–10.8)

## 2023-02-08 PROCEDURE — 85027 COMPLETE CBC AUTOMATED: CPT

## 2023-02-08 PROCEDURE — 80048 BASIC METABOLIC PNL TOTAL CA: CPT

## 2023-02-08 PROCEDURE — 6360000002 HC RX W HCPCS: Performed by: SURGERY

## 2023-02-08 PROCEDURE — APPSS30 APP SPLIT SHARED TIME 16-30 MINUTES: Performed by: NURSE PRACTITIONER

## 2023-02-08 PROCEDURE — 6360000002 HC RX W HCPCS: Performed by: NURSE PRACTITIONER

## 2023-02-08 PROCEDURE — 6370000000 HC RX 637 (ALT 250 FOR IP): Performed by: SURGERY

## 2023-02-08 PROCEDURE — 1200000000 HC SEMI PRIVATE

## 2023-02-08 PROCEDURE — 2580000003 HC RX 258: Performed by: NURSE PRACTITIONER

## 2023-02-08 PROCEDURE — 2580000003 HC RX 258: Performed by: SURGERY

## 2023-02-08 PROCEDURE — 99024 POSTOP FOLLOW-UP VISIT: CPT | Performed by: NURSE PRACTITIONER

## 2023-02-08 PROCEDURE — 36415 COLL VENOUS BLD VENIPUNCTURE: CPT

## 2023-02-08 RX ORDER — SODIUM CHLORIDE 9 MG/ML
INJECTION, SOLUTION INTRAVENOUS CONTINUOUS
Status: DISCONTINUED | OUTPATIENT
Start: 2023-02-08 | End: 2023-02-10

## 2023-02-08 RX ADMIN — HYDROMORPHONE HYDROCHLORIDE 1 MG: 1 INJECTION, SOLUTION INTRAMUSCULAR; INTRAVENOUS; SUBCUTANEOUS at 00:18

## 2023-02-08 RX ADMIN — HYDROMORPHONE HYDROCHLORIDE 1 MG: 1 INJECTION, SOLUTION INTRAMUSCULAR; INTRAVENOUS; SUBCUTANEOUS at 13:18

## 2023-02-08 RX ADMIN — HYDROCODONE BITARTRATE AND ACETAMINOPHEN 2 TABLET: 5; 325 TABLET ORAL at 20:54

## 2023-02-08 RX ADMIN — HYDROCODONE BITARTRATE AND ACETAMINOPHEN 2 TABLET: 5; 325 TABLET ORAL at 15:54

## 2023-02-08 RX ADMIN — HYDROMORPHONE HYDROCHLORIDE 1 MG: 1 INJECTION, SOLUTION INTRAMUSCULAR; INTRAVENOUS; SUBCUTANEOUS at 05:35

## 2023-02-08 RX ADMIN — PIPERACILLIN AND TAZOBACTAM 3375 MG: 3; .375 INJECTION, POWDER, FOR SOLUTION INTRAVENOUS at 05:39

## 2023-02-08 RX ADMIN — ENOXAPARIN SODIUM 40 MG: 100 INJECTION SUBCUTANEOUS at 08:32

## 2023-02-08 RX ADMIN — SODIUM CHLORIDE: 9 INJECTION, SOLUTION INTRAVENOUS at 06:07

## 2023-02-08 RX ADMIN — PIPERACILLIN AND TAZOBACTAM 3375 MG: 3; .375 INJECTION, POWDER, FOR SOLUTION INTRAVENOUS at 14:13

## 2023-02-08 RX ADMIN — HYDROMORPHONE HYDROCHLORIDE 1 MG: 1 INJECTION, SOLUTION INTRAMUSCULAR; INTRAVENOUS; SUBCUTANEOUS at 03:17

## 2023-02-08 RX ADMIN — SODIUM CHLORIDE: 9 INJECTION, SOLUTION INTRAVENOUS at 09:17

## 2023-02-08 RX ADMIN — PIPERACILLIN AND TAZOBACTAM 3375 MG: 3; .375 INJECTION, POWDER, FOR SOLUTION INTRAVENOUS at 22:24

## 2023-02-08 RX ADMIN — SODIUM CHLORIDE, PRESERVATIVE FREE 10 ML: 5 INJECTION INTRAVENOUS at 08:32

## 2023-02-08 RX ADMIN — NALOXEGOL OXALATE 25 MG: 25 TABLET, FILM COATED ORAL at 08:32

## 2023-02-08 RX ADMIN — FLUCONAZOLE, SODIUM CHLORIDE 100 MG: 2 INJECTION INTRAVENOUS at 12:59

## 2023-02-08 RX ADMIN — HYDROCODONE BITARTRATE AND ACETAMINOPHEN 2 TABLET: 5; 325 TABLET ORAL at 10:34

## 2023-02-08 RX ADMIN — HYDROMORPHONE HYDROCHLORIDE 1 MG: 1 INJECTION, SOLUTION INTRAMUSCULAR; INTRAVENOUS; SUBCUTANEOUS at 08:23

## 2023-02-08 ASSESSMENT — PAIN DESCRIPTION - ORIENTATION
ORIENTATION: MID
ORIENTATION: LEFT
ORIENTATION: LEFT
ORIENTATION: LOWER;LEFT
ORIENTATION: LOWER;LEFT
ORIENTATION: LEFT
ORIENTATION: LEFT

## 2023-02-08 ASSESSMENT — PAIN DESCRIPTION - LOCATION
LOCATION: ABDOMEN

## 2023-02-08 ASSESSMENT — PAIN DESCRIPTION - DESCRIPTORS
DESCRIPTORS: ACHING
DESCRIPTORS: STABBING
DESCRIPTORS: ACHING
DESCRIPTORS: ACHING;CRAMPING
DESCRIPTORS: ACHING;SHARP
DESCRIPTORS: ACHING
DESCRIPTORS: ACHING

## 2023-02-08 ASSESSMENT — PAIN SCALES - GENERAL
PAINLEVEL_OUTOF10: 6
PAINLEVEL_OUTOF10: 7
PAINLEVEL_OUTOF10: 6
PAINLEVEL_OUTOF10: 7
PAINLEVEL_OUTOF10: 7
PAINLEVEL_OUTOF10: 6
PAINLEVEL_OUTOF10: 7
PAINLEVEL_OUTOF10: 7
PAINLEVEL_OUTOF10: 6
PAINLEVEL_OUTOF10: 5
PAINLEVEL_OUTOF10: 7

## 2023-02-08 ASSESSMENT — PAIN SCALES - WONG BAKER: WONGBAKER_NUMERICALRESPONSE: 6

## 2023-02-08 ASSESSMENT — PAIN - FUNCTIONAL ASSESSMENT
PAIN_FUNCTIONAL_ASSESSMENT: ACTIVITIES ARE NOT PREVENTED

## 2023-02-08 NOTE — PROGRESS NOTES
[address] [place] [address] for Dr. Reece [facility]era Surgery Postoperative Progress Note    Pt Name: Kelsey Ruiz  Medical Record Number: [id number]  Date of Birth 2000   Today's Date: 2/8/2023    ASSESSMENT   POD # 1 Status post robotic ileocecectomy secondary to crohn's disease with ileocecal stricture   has a past medical history of Crohn's colitis ([address] Environmental allergies, and Pilonidal cyst.  PLAN   Clears as tolerated  Decrease fluids  Antibiotics  Remove stanley  Up OOB today  Incentive spirometer  Pain control  Resume home medications   Lovenox for DVT prophylaxis  Incisional and TOM drain management   Labs reviewed. Trend WBC. Pathology pending   Clinically, looks pretty good. Await bowel function. SUBJECTIVE   Patient was stable overnight. Vs stable. No fevers. Chart reviewed. Updated by nursing staff. Had a lot of pain overnight but doing much better today. Denies chest discomfort or dyspnea. No N/V; (+) belching, but no flatus or BM. Tolerating ADULT DIET; Clear Liquid diet. Pain controlled with Dilaudid. Has not been OOB. Stanley in place. Abdominal incisions are dry and intact. CURRENT MEDICATIONS   Scheduled Meds:   sertraline  50 mg Oral Daily    scopolamine  1 patch TransDERmal Once    fluconazole  100 mg IntraVENous Q24H    sodium chloride flush  5-40 mL IntraVENous 2 times per day    enoxaparin  40 mg SubCUTAneous Daily    naloxegol  25 mg Oral Daily    piperacillin-tazobactam  3,375 mg IntraVENous Q8H     Continuous Infusions:   sodium chloride      sodium chloride       PRN Meds:. HYDROcodone 5 mg - acetaminophen **OR** HYDROcodone 5 mg - acetaminophen, hyoscyamine, sodium chloride flush, sodium chloride, ondansetron **OR** ondansetron, HYDROmorphone **OR** HYDROmorphone  OBJECTIVE   CURRENT VITALS:  height is 5' 2\" (1.575 m) and weight is 154 lb (69.9 kg). Her oral temperature is 98.4 °F (36.9 °C). Her blood pressure is 123/79 and her pulse is 81. Her respiration is 18 and oxygen saturation is 99%. Temperature Range (24h):Temp: 98.4 °F (36.9 °C) Temp  Av.3 °F (36.8 °C)  Min: 97.7 °F (36.5 °C)  Max: 98.8 °F (37.1 °C)  BP Range (44R): Systolic (82MIZ), WIA:982 , Min:116 , FCN:777     Diastolic (04III), UDZ:54, Min:58, Max:87    Pulse Range (24h): Pulse  Av.2  Min: 81  Max: 110  Respiration Range (24h): Resp  Av.9  Min: 12  Max: 20  Current Pulse Ox (24h):  SpO2: 99 %  Pulse Ox Range (24h):  SpO2  Av.7 %  Min: 95 %  Max: 99 %  Oxygen Amount and Delivery:    Incentive Spirometry Tx:            GENERAL: alert, no distress  LUNGS: clear to ausculation, without wheezes, rales or rhonci  HEART: normal rate and regular rhythm  ABDOMEN: distended, soft, incisional tenderness, bowel sounds hypoactive, TOM drain serosanguinous   INCISION: lower abdominal dressing dry and intact, robotic sites with band-aids no bleeding or drainage  EXTREMITY: no cyanosis, clubbing or edema  In: 2350 [I.V.:2200]  Out: 3160 [Urine:3025; Drains:85]  Date 23 0000 - 23 2359   Shift 2450-2976 1393-6183 8612-5195 24 Hour Total   INTAKE   Shift Total(mL/kg)       OUTPUT   Urine(mL/kg/hr) 1750   1750   Drains 45   45   Shift Total(mL/kg) 1795(25.7)   1795(25.7)   Weight (kg) 69.9 69.9 69.9 69.9     LABS     Recent Labs     23  1448 23  0439   WBC  --  14.6*   HGB  --  10.4*   HCT  --  35.3*   PLT  --  347    137   K 3.5 4.1    102   CO2 18* 23   BUN 7 5*   CREATININE 0.6 0.6   MG 1.7  --    CALCIUM 8.1* 9.1      No results for input(s): PTT, INR in the last 72 hours. Invalid input(s): PT  No results for input(s): AST, ALT, BILITOT, BILIDIR, AMYLASE, LIPASE, LDH, LACTA in the last 72 hours. No results for input(s): TROPONINT in the last 72 hours.      Pathology pending   RADIOLOGY   No new imaging    Electronically signed by LUANN Drake CNP on 2023 at 7:59 AM

## 2023-02-08 NOTE — PLAN OF CARE
Problem: Discharge Planning  Goal: Discharge to home or other facility with appropriate resources  Outcome: Progressing  Flowsheets (Taken 2/8/2023 0104)  Discharge to home or other facility with appropriate resources:   Identify barriers to discharge with patient and caregiver   Arrange for needed discharge resources and transportation as appropriate   Identify discharge learning needs (meds, wound care, etc)   Refer to discharge planning if patient needs post-hospital services based on physician order or complex needs related to functional status, cognitive ability or social support system     Problem: Pain  Goal: Verbalizes/displays adequate comfort level or baseline comfort level  Outcome: Progressing  Flowsheets (Taken 2/8/2023 0104)  Verbalizes/displays adequate comfort level or baseline comfort level:   Encourage patient to monitor pain and request assistance   Assess pain using appropriate pain scale   Administer analgesics based on type and severity of pain and evaluate response   Implement non-pharmacological measures as appropriate and evaluate response   Care plan reviewed with patient and mother. Patient and mother verbalize understanding of the plan of care and contribute to goal setting.

## 2023-02-08 NOTE — PLAN OF CARE
Problem: Discharge Planning  Goal: Discharge to home or other facility with appropriate resources  2/8/2023 0959 by Polina Moya RN  Outcome: Progressing  Flowsheets (Taken 2/8/2023 9293)  Discharge to home or other facility with appropriate resources:   Identify barriers to discharge with patient and caregiver   Identify discharge learning needs (meds, wound care, etc)   Arrange for needed discharge resources and transportation as appropriate     Problem: Pain  Goal: Verbalizes/displays adequate comfort level or baseline comfort level  2/8/2023 0959 by Polina Moya RN  Outcome: Progressing  Flowsheets (Taken 2/8/2023 0959)  Verbalizes/displays adequate comfort level or baseline comfort level:   Encourage patient to monitor pain and request assistance   Administer analgesics based on type and severity of pain and evaluate response   Assess pain using appropriate pain scale     Problem: Skin/Tissue Integrity - Adult  Goal: Skin integrity remains intact  Outcome: Progressing  Flowsheets (Taken 2/8/2023 0959)  Skin Integrity Remains Intact: Monitor for areas of redness and/or skin breakdown  Note: Patient has no new skin issues at this time. Patient encouraged to reposition every two hours. Skin assessment completed and ongoing. Surgical incisions to abdomen intact and well approximated. TOM drain in place to LLQ. Problem: Skin/Tissue Integrity - Adult  Goal: Incisions, wounds, or drain sites healing without S/S of infection  Outcome: Progressing  Flowsheets (Taken 2/8/2023 0959)  Incisions, Wounds, or Drain Sites Healing Without Sign and Symptoms of Infection: ADMISSION and DAILY: Assess and document risk factors for pressure ulcer development  Note: Patient has no new skin issues at this time. Patient encouraged to reposition every two hours. Skin assessment completed and ongoing. Surgical incisions to abdomen intact and well approximated. TOM drain in place to LLQ. No signs of infection.      Problem: Genitourinary - Adult  Goal: Absence of urinary retention  Outcome: Progressing  Flowsheets (Taken 2/8/2023 0574)  Absence of urinary retention:   Monitor intake/output and perform bladder scan as needed   Assess patients ability to void and empty bladder     Problem: Genitourinary - Adult  Goal: Urinary catheter remains patent  Outcome: Completed  Note: Tierney catheter removed. Care plan reviewed with patient. Patient  verbalize understanding of the plan of care and contribute to goal setting. No therapy plan of the specified type found.

## 2023-02-08 NOTE — CARE COORDINATION
Case Management Assessment  Initial Evaluation    Date/Time of Evaluation: 2/8/2023 11:51 AM  Assessment Completed by: Chrissie Parrish RN    If patient is discharged prior to next notation, then this note serves as note for discharge by case management. Patient Name: Shanta Roberto                   YOB: 2000  Diagnosis: Crohn's disease with complication, unspecified gastrointestinal tract location [facility]) [K50.919]  [facility] [K50.912]                   Date / Time: 2/7/2023  7:32 AM  Location: [facility]16/016-A     Patient Admission Status: Inpatient   Readmission Risk Low 0-14, Mod 15-19), High > 20: Readmission Risk Score: 4.6    Current PCP: Lubna Oswald, DO  PCP verified by CM? Yes    Chart Reviewed: Yes      History Provided by: Patient  Patient Orientation: Alert and Oriented, Person, Place, Situation, Self    Patient Cognition: Alert    Hospitalization in the last 30 days (Readmission):  No    If yes, Readmission Assessment in CM Navigator will be completed. Advance Directives:      Code Status: Full Code   Patient's Primary Decision Maker is: Legal Next of Kin      Discharge Planning:    Patient lives with: Parent Type of Home: House  Primary Care Giver: Self  Patient Support Systems include: Parent, Friends/Neighbors   Current Financial resources: Medicaid  Current community resources: None  Current services prior to admission: None            Current DME:              Type of Home Care services:  None    ADLS  Prior functional level: Independent in ADLs/IADLs  Current functional level: Independent in ADLs/IADLs    Family can provide assistance at DC: Yes  Would you like Case Management to discuss the discharge plan with any other family members/significant others, and if so, who?  No  Plans to Return to Present Housing: Yes  Other Identified Issues/Barriers to RETURNING to current housing: None  Potential Assistance needed at discharge: N/A Potential DME:    Patient expects to discharge to: [facility] for transportation at discharge: Family    Financial    Payor: Ramon Manual / Plan: Ángel Diego / Product Type: *No Product type* /     Does insurance require precert for SNF: Yes    Potential assistance Purchasing Medications: No  Meds-to-Beds request: Yes      [facility] [id number] - [place], [address] - F [phone]  [address] [facility] [address]  Phone: [phone] Fax: [phone]      Notes:    Factors facilitating achievement of predicted outcomes: Family support, Motivated, Cooperative, Pleasant, Sense of humor, and Good insight into deficits    Barriers to discharge: Pain, tolerance to diet and return of bowel function. Additional Case Management Notes: Patient presents from PACU. Procedure:   2/07/2023  Robotic ileocecectomy. The Plan for Transition of Care is related to the following treatment goals of Crohn's disease with complication, unspecified gastrointestinal tract location [facility]) [K50.919]  [facility] [K50.912]    Patient Goals/Plan/Treatment Preferences: Met with Sam Delaney and her father present at bedside. Sam Delaney resides at home with her parents. She is able to afford her medications and does not have a need for services or DME at discharge. Her parents will drive her home. Transportation/Food Security/Housekeeping Addressed: No issues identified.      Johnny Grover RN  Case Management Department

## 2023-02-08 NOTE — OP NOTE
[address] [place] [address]                                OPERATIVE REPORT    PATIENT NAME: Luz Olson                     :        2000  MED REC NO:   [id number]                           ROOM:       0016  ACCOUNT NO:   [de-identified]                           ADMIT DATE: 2023  PROVIDER:     Casey Holt M.D.    DATE OF PROCEDURE:  2023    PREOPERATIVE DIAGNOSIS:  Crohn disease with ileocecal stricture. POSTOPERATIVE DIAGNOSIS:  Crohn disease with ileocecal stricture. PROCEDURE:  Robotic ileocecectomy. SURGEON:  Casey Holt MD    ASSISTANT:  Abdirashid Concepcion. CASTILLO Marquis    ANESTHESIA:  General/local.    ESTIMATED BLOOD LOSS:  50 mL. DRAIN:  A #15 round Patrice drain placed into the pelvis. COMPLICATIONS:  None. DISPOSITION:  Stable to the recovery room. INDICATIONS:  The patient is a 80-year-old female who I had seen in the  office several times in the past secondary to Crohn's. Recently seen  with her mother secondary to worsening Crohn disease at the ileocecal  region. Known stricture. Both operative and nonoperative intervention  plans were discussed. Risks of surgery were further discussed. Some of  the risks included but were not limited to bleeding, infection, the need  for reoperation, severe chronic postoperative pain or numbness, major  vascular or nerve injury, cardiopulmonary complications, anesthetic  complications, seroma or hematoma formation, wound breakdown, trocar  site herniation, anastomotic leak, anastomotic bleed, anastomotic  stricture, chronic pain and death. After all of the questions were  answered in their entirety and the patient was completely aware of  current situation, they wished to proceed with surgery.     DESCRIPTION OF THE PROCEDURE:  After informed consent was signed and  placed on the chart, the patient was taken back to the operating room  and placed supine on the operating room table. General anesthesia was  induced. She tolerated this throughout the case. All pressure points  were padded. She was on preoperative antibiotics. Bilateral lower  extremity sequential compression devices were placed prior to incision. Tierney catheter was placed under standard sterile technique. She was on  preoperative antibiotics. Her abdomen and pelvis was prepped and draped  in the usual sterile standard fashion. A time-out occurred prior to the  operation which not only identified the patient but also the planned  procedure to be performed. At the end of the time-out, there were no  questions or concerns. I began the operation by making a small skin  nick at Singer's point. Veress needle was inserted. Intra-abdominal  cavity was insufflated to a pressure of approximately 15 mmHg of carbon  dioxide gas. The patient tolerated insufflation well. An 8-mm trocar  was placed over the left side of the abdomen. Laparoscope was inserted. Upon initial evaluation, there was no hollow viscus sign or major  vascular injury with the Veress needle insertion or the first trocar  placement. Three other 8-mm trocars were placed in the standard  location under direct vision. A 12 mm was then placed in the far left  lateral aspect for the assistant. The patient was placed in slight  Trendelenburg. Right side was elevated. Robot was brought in and  docked. Instruments were placed under direct vision. Once everything  was aligned and in order, I then unscrubbed and back to the console. I  began the operation by evaluating the abdomen. Omentum as well as the  diseased area of concern was tethered up to the low midline pelvic  region. There appeared to be possibly somewhat of a urachal tract from  the bladder. During takedown purulence/pus was noted. This was felt to have infection present at time of operation. This was suctioned out and cleaned/irrigated.   After this was all taken down with the monopolar scissors there did not appear to be a connection with the diseased bowel and bladder/urinary system. There  did not appear to be any obvious fistulous tract. The rest of the  abdomen otherwise looked to be within normal limits. The diseased area  was simply the ileocecal region. Ascending colon looked to be within  normal limits as well as the rest of the ileum. I elected to transect  the first at the ileum region with an Artois ZACHARY 60 blue load. This  was the first area of normal bowel more proximal.  Mesentery was taken  with the vessel sealer as well as with 60-mm vascular cartridge using  the Artois as some of the mesentery was extremely edematous and  thick/friable. The cecum was then transected with an Artois ZACHARY 60  blue load x2. Rest of the ileum otherwise looked good. Specimens were  then placed in right upper quadrant. Irrigation. Hemostasis appeared  to be adequate. Irrigant return was clear. Ileum was able to be  brought to lie next to the ascending colon after the ascending colon was  mobilized slightly. The antimesenteric border of the ileum was then  brought next to the tenia of the ascending colon with a 3-0 Vicryl  suture. Colotomy and enterotomy was then made with the monopolar  scissors. Artois ZACHARY 60 blue load was then brought through each of the  openings and then closed, fired and anastomosis formed. The common  channel enterotomy was then closed with a running 3-0 V-Loc x2. Anastomosis appeared widely patent. Good blood flow. This was  confirmed with Firefly. Adequate hemostasis. No twisting or torsion of  the mesentery. The anastomosis was then reinforced with Vistaseal to  ensure good hemostasis and also improve support. Again, no other  abnormalities were identified. Then, a 5-mm trocar was then placed low  there in the pelvis. Specimen was then grasped. Instruments were  removed. Robot undocked.   I scrubbed back into the case.  Low  transverse incision was made with a 15-blade scalpel as well as  electrocautery. Wound protector was then placed. The specimen was  removed, sent to Pathology for permanent. The area of concern on the  anterior abdominal wall was then again re-evaluated as I could see this  fairly well, and this was oversewed to ensure good closure with 0 Vicryl  suture x3. Wound protector would then twist upon itself and clamped  with [facility]. Abdomen was re-insufflated. The abdomen was then  re-explored. No other abnormalities. Hemostasis adequate. I still  went ahead and put 300 mL of blue dye saline through the Tierney into the  bladder to blow the bladder up. Again, there was never any kind of  leakage where there was a inflamed irritated area in the anterior  abdominal wall that the specimen itself was taken down and off. The  blue dye and Tierney catheter was then left open. The wound protector was  removed. Fascia was then closed here with a running #1 Stratafix x2. Subcutaneous tissues were irrigated. Hemostasis adequate. Large trocar  site over on the left side was also closed at the fascia with Vicryl  suture. Skin was reapproximated at all the incisional sites with 4-0  Vicryl in a subcuticular fashion. Closed incisions were then clean, dry  and Steri-Strips applied. Dry sterile dressings were applied. Sponge,  needle and instrumentation count was correct at the end of the  procedure. The patient tolerated the procedure well with no apparent  complications and only about 40-50 mL of blood loss. She was brought  out of general anesthesia and transferred to postanesthesia care unit in  stable condition.         Thomas Kearns M.D.    D: 02/07/2023 12:08:52       T: 02/07/2023 12:12:16     [email or web address]  Job#: [id number]     Doc#: [id number]    CC:

## 2023-02-09 LAB
ANION GAP SERPL CALC-SCNC: 11 MEQ/L (ref 8–16)
BUN SERPL-MCNC: 8 MG/DL (ref 7–22)
CALCIUM SERPL-MCNC: 8.8 MG/DL (ref 8.5–10.5)
CHLORIDE SERPL-SCNC: 102 MEQ/L (ref 98–111)
CO2 SERPL-SCNC: 25 MEQ/L (ref 23–33)
CREAT SERPL-MCNC: 0.6 MG/DL (ref 0.4–1.2)
DEPRECATED RDW RBC AUTO: 44.2 FL (ref 35–45)
ERYTHROCYTE [DISTWIDTH] IN BLOOD BY AUTOMATED COUNT: 15.7 % (ref 11.5–14.5)
GFR SERPL CREATININE-BSD FRML MDRD: > 60 ML/MIN/1.73M2
GLUCOSE SERPL-MCNC: 83 MG/DL (ref 70–108)
HCT VFR BLD AUTO: 31.9 % (ref 37–47)
HGB BLD-MCNC: 9.3 GM/DL (ref 12–16)
MCH RBC QN AUTO: 22.7 PG (ref 26–33)
MCHC RBC AUTO-ENTMCNC: 29.2 GM/DL (ref 32.2–35.5)
MCV RBC AUTO: 77.8 FL (ref 81–99)
PLATELET # BLD AUTO: 274 THOU/MM3 (ref 130–400)
PMV BLD AUTO: 9 FL (ref 9.4–12.4)
POTASSIUM SERPL-SCNC: 3.4 MEQ/L (ref 3.5–5.2)
RBC # BLD AUTO: 4.1 MILL/MM3 (ref 4.2–5.4)
SODIUM SERPL-SCNC: 138 MEQ/L (ref 135–145)
WBC # BLD AUTO: 8 THOU/MM3 (ref 4.8–10.8)

## 2023-02-09 PROCEDURE — 6360000002 HC RX W HCPCS: Performed by: SURGERY

## 2023-02-09 PROCEDURE — 1200000000 HC SEMI PRIVATE

## 2023-02-09 PROCEDURE — 85027 COMPLETE CBC AUTOMATED: CPT

## 2023-02-09 PROCEDURE — 2580000003 HC RX 258: Performed by: NURSE PRACTITIONER

## 2023-02-09 PROCEDURE — 2500000003 HC RX 250 WO HCPCS: Performed by: NURSE PRACTITIONER

## 2023-02-09 PROCEDURE — 36415 COLL VENOUS BLD VENIPUNCTURE: CPT

## 2023-02-09 PROCEDURE — 6370000000 HC RX 637 (ALT 250 FOR IP): Performed by: SURGERY

## 2023-02-09 PROCEDURE — 2580000003 HC RX 258: Performed by: SURGERY

## 2023-02-09 PROCEDURE — 80048 BASIC METABOLIC PNL TOTAL CA: CPT

## 2023-02-09 PROCEDURE — 99024 POSTOP FOLLOW-UP VISIT: CPT | Performed by: SURGERY

## 2023-02-09 RX ORDER — POTASSIUM CHLORIDE 7.45 MG/ML
10 INJECTION INTRAVENOUS PRN
Status: DISCONTINUED | OUTPATIENT
Start: 2023-02-09 | End: 2023-02-11 | Stop reason: HOSPADM

## 2023-02-09 RX ORDER — POTASSIUM CHLORIDE 20 MEQ/1
40 TABLET, EXTENDED RELEASE ORAL PRN
Status: DISCONTINUED | OUTPATIENT
Start: 2023-02-09 | End: 2023-02-11 | Stop reason: HOSPADM

## 2023-02-09 RX ADMIN — HYDROCODONE BITARTRATE AND ACETAMINOPHEN 2 TABLET: 5; 325 TABLET ORAL at 23:07

## 2023-02-09 RX ADMIN — HYDROCODONE BITARTRATE AND ACETAMINOPHEN 1 TABLET: 5; 325 TABLET ORAL at 14:20

## 2023-02-09 RX ADMIN — PIPERACILLIN AND TAZOBACTAM 3375 MG: 3; .375 INJECTION, POWDER, FOR SOLUTION INTRAVENOUS at 14:24

## 2023-02-09 RX ADMIN — HYDROCODONE BITARTRATE AND ACETAMINOPHEN 2 TABLET: 5; 325 TABLET ORAL at 10:06

## 2023-02-09 RX ADMIN — POTASSIUM CHLORIDE 40 MEQ: 1500 TABLET, EXTENDED RELEASE ORAL at 08:41

## 2023-02-09 RX ADMIN — HYDROCODONE BITARTRATE AND ACETAMINOPHEN 2 TABLET: 5; 325 TABLET ORAL at 18:14

## 2023-02-09 RX ADMIN — HYDROCODONE BITARTRATE AND ACETAMINOPHEN 2 TABLET: 5; 325 TABLET ORAL at 02:04

## 2023-02-09 RX ADMIN — ENOXAPARIN SODIUM 40 MG: 100 INJECTION SUBCUTANEOUS at 08:40

## 2023-02-09 RX ADMIN — SODIUM CHLORIDE: 9 INJECTION, SOLUTION INTRAVENOUS at 23:04

## 2023-02-09 RX ADMIN — MICONAZOLE NITRATE: 20 POWDER TOPICAL at 20:20

## 2023-02-09 RX ADMIN — MICONAZOLE NITRATE: 20 POWDER TOPICAL at 13:14

## 2023-02-09 RX ADMIN — PIPERACILLIN AND TAZOBACTAM 3375 MG: 3; .375 INJECTION, POWDER, FOR SOLUTION INTRAVENOUS at 06:12

## 2023-02-09 RX ADMIN — PIPERACILLIN AND TAZOBACTAM 3375 MG: 3; .375 INJECTION, POWDER, FOR SOLUTION INTRAVENOUS at 22:07

## 2023-02-09 RX ADMIN — FLUCONAZOLE, SODIUM CHLORIDE 100 MG: 2 INJECTION INTRAVENOUS at 13:16

## 2023-02-09 RX ADMIN — NALOXEGOL OXALATE 25 MG: 25 TABLET, FILM COATED ORAL at 08:40

## 2023-02-09 RX ADMIN — HYDROCODONE BITARTRATE AND ACETAMINOPHEN 1 TABLET: 5; 325 TABLET ORAL at 06:17

## 2023-02-09 RX ADMIN — SODIUM CHLORIDE: 9 INJECTION, SOLUTION INTRAVENOUS at 02:07

## 2023-02-09 ASSESSMENT — PAIN DESCRIPTION - ORIENTATION
ORIENTATION: LOWER;MID
ORIENTATION: MID;LOWER
ORIENTATION: MID
ORIENTATION: LOWER;MID
ORIENTATION: LEFT;MID
ORIENTATION: MID;LOWER
ORIENTATION: MID;LOWER

## 2023-02-09 ASSESSMENT — PAIN DESCRIPTION - LOCATION
LOCATION: ABDOMEN

## 2023-02-09 ASSESSMENT — PAIN DESCRIPTION - PAIN TYPE
TYPE: SURGICAL PAIN
TYPE: SURGICAL PAIN

## 2023-02-09 ASSESSMENT — PAIN SCALES - GENERAL
PAINLEVEL_OUTOF10: 8
PAINLEVEL_OUTOF10: 8
PAINLEVEL_OUTOF10: 6
PAINLEVEL_OUTOF10: 5
PAINLEVEL_OUTOF10: 7
PAINLEVEL_OUTOF10: 3
PAINLEVEL_OUTOF10: 5
PAINLEVEL_OUTOF10: 3
PAINLEVEL_OUTOF10: 7
PAINLEVEL_OUTOF10: 0

## 2023-02-09 ASSESSMENT — PAIN DESCRIPTION - DESCRIPTORS
DESCRIPTORS: ACHING
DESCRIPTORS: ACHING
DESCRIPTORS: SORE
DESCRIPTORS: ACHING
DESCRIPTORS: STABBING;SHARP
DESCRIPTORS: ACHING

## 2023-02-09 ASSESSMENT — PAIN - FUNCTIONAL ASSESSMENT
PAIN_FUNCTIONAL_ASSESSMENT: ACTIVITIES ARE NOT PREVENTED
PAIN_FUNCTIONAL_ASSESSMENT: ACTIVITIES ARE NOT PREVENTED

## 2023-02-09 NOTE — PROGRESS NOTES
[facility] Post[facility] Progress [facility]    Pt Name: Luly Jaime  Medical Record Number: [id number]  Date of Birth 2000   Today's Date: 2/9/2023    ASSESSMENT   POD # 2 Status post robotic ileocecectomy secondary to crohn's disease with ileocecal stricture   has a past medical history of Crohn's colitis ([address] Environmental allergies, and Pilonidal cyst.  PLAN   Full liquid diet today  Decrease fluids  Antibiotics  Urinating spontaneously  Ambulate  Incentive spirometer  Replace electrolytes as needed per protocol. Pain control  Resumed home medications   Lovenox for DVT prophylaxis  Incisional and TOM drain management   Labs reviewed. Leukocytosis resolved. Repeat hemoglobin in AM.  No signs of active bleeding clinically. Pathology reviewed with expected findings. Clinically, looks pretty good. Await bowel function. SUBJECTIVE   Patient was stable overnight. Vs stable. No fevers. Chart reviewed. Pain better controlled today. Urinating spontaneously. Denies chest discomfort or dyspnea. No N/V; (+) belching, but no flatus or BM. Tolerating clear liquids. Abdominal incisions are dry and intact.    CURRENT MEDICATIONS   Scheduled Meds:   sertraline  50 mg Oral Daily    scopolamine  1 patch TransDERmal Once    fluconazole  100 mg IntraVENous Q24H    sodium chloride flush  5-40 mL IntraVENous 2 times per day    enoxaparin  40 mg SubCUTAneous Daily    naloxegol  25 mg Oral Daily    piperacillin-tazobactam  3,375 mg IntraVENous Q8H     Continuous Infusions:   sodium chloride 50 mL/hr at 02/09/23 0207    sodium chloride       PRN Meds:.potassium chloride **OR** potassium alternative oral replacement **OR** potassium chloride, HYDROcodone 5 mg - acetaminophen **OR** HYDROcodone 5 mg - acetaminophen, hyoscyamine, sodium chloride flush, sodium chloride, ondansetron **OR** ondansetron, HYDROmorphone **OR** HYDROmorphone  OBJECTIVE   CURRENT VITALS:  height is 5' 2\" (1.575 m) and weight is 154 lb (69.9 kg). Her oral temperature is 98.2 °F (36.8 °C). Her blood pressure is 115/66 and her pulse is 86. Her respiration is 16 and oxygen saturation is 99%. Temperature Range (24h):Temp: 98.2 °F (36.8 °C) Temp  Av.2 °F (36.8 °C)  Min: 97.9 °F (36.6 °C)  Max: 98.4 °F (36.9 °C)  BP Range (06F): Systolic (00MLP), QOQ:207 , Min:112 , DII:186     Diastolic (15LPT), TAU:21, Min:65, Max:78    Pulse Range (24h): Pulse  Av.1  Min: 70  Max: 86  Respiration Range (24h): Resp  Av.6  Min: 14  Max: 20  Current Pulse Ox (24h):  SpO2: 99 %  Pulse Ox Range (24h):  SpO2  Av %  Min: 97 %  Max: 99 %  Oxygen Amount and Delivery:    Incentive Spirometry Tx:            GENERAL: alert, no distress  LUNGS: clear to ausculation, without wheezes, rales or rhonci  HEART: normal rate and regular rhythm  ABDOMEN: distended, soft, incisional tenderness, bowel sounds hypoactive, TOM drain serosanguinous   INCISION: lower abdominal dressing dry and intact - incision clean and dry and intact. No bleeding. No breakdown. Robotic sites with band-aids no bleeding or drainage  EXTREMITY: no cyanosis, clubbing or edema    In: 1014.9 [P.O.:820; I.V.:10]  Out: 500 [Urine:350; Drains:150]  Date 23 0000 - 23 2359   Shift 1169-7147 5859-9692 1595-8128 24 Hour Total   INTAKE   P.O.(mL/kg/hr) 800(1.4)   800   Shift Total(mL/kg) 800(11.5)   800(11.5)   OUTPUT   Drains(mL/kg) 100(1.4)   100(1.4)   Shift Total(mL/kg) 100(1.4)   100(1.4)   Weight (kg) 69.9 69.9 69.9 69.9     LABS     Recent Labs     23  1448 23  0439 23  0523   WBC  --  14.6* 8.0   HGB  --  10.4* 9.3*   HCT  --  35.3* 31.9*   PLT  --  347 274    137 138   K 3.5 4.1 3.4*    102 102   CO2 18* 23 25   BUN 7 5* 8   CREATININE 0.6 0.6 0.6   MG 1.7  --   --    CALCIUM 8.1* 9.1 8.8      No results for input(s): PTT, INR in the last 72 hours.     Invalid input(s): PT  No results for input(s): AST, ALT, BILITOT, BILIDIR, AMYLASE, LIPASE, LDH, LACTA in the last 72 hours. No results for input(s): TROPONINT in the last 72 hours. PATHOLOGY REPORT                       ATTN: Nabil Ceballos                       REQ: Nabil Ceballos     Copies To:   LUPILLO BERMAN     Clinical Information: CROHN'S DISEASE WITH COMPLICATION, UNSPECIFIED   GASTROINTESTINAL TRACT LOCATION [K50.689]     FINAL DIAGNOSIS:   Ileocecum, resection:     Crohn enteritis with stricture and transmural fissure with abscess   formation. Incidental endometriosis/endosalpingosis. Specimen:   COLON, ILEOCECUM     Gross Examination:   The container is labeled Lola Young, ileocecum. Received in formalin   is a segmental resection of bowel including terminal ileum, cecum, and   appendix. The specimen measures about 18 cm in length. The appendix   measures 4 cm in length x about 0.7 cm in average diameter. Sections   through the appendix reveal no discrete lesions. The specimen is   opened longitudinally revealing an ulcerated and eroded mucosal surface   in the terminal ileum. Serial cross-sections reveal an abscess cavity   adjacent to the terminal ileum. Representative sections are submitted   taken proximal to distal.  Cassette #1 - margin; cassette #2 -   appendix; and cassettes #3 through #7 - representative sections. ss.   SMW/DKR:[id number]     Microscopic Examination:   Sections demonstrate marked crypt architectural distortion, lamina   propria lymphoplasmacytic inflammation, basal lymphoplasmacytosis,   submucosal fibrosis, transmural lymphoid aggregates, neural hypertrophy   and occasional granulomas, consistent with Crohn enteritis. There is a   transmural fissure with an associated abscess. There is no definite   colonic involvement. The appendix is relatively unremarkable. Focal   endometriosis is present external to the muscularis propria. There is   no evidence of dysplasia or malignancy. Higinio Huffman M.D., F.C. A. P     RADIOLOGY   No new imaging    Electronically signed by Donna Moss MD on 2/9/2023 at 10:10 AM

## 2023-02-09 NOTE — PLAN OF CARE
Problem: Discharge Planning  Goal: Discharge to home or other facility with appropriate resources  Outcome: Progressing  Flowsheets (Taken 2/9/2023 0058)  Discharge to home or other facility with appropriate resources:   Arrange for needed discharge resources and transportation as appropriate   Identify barriers to discharge with patient and caregiver   Identify discharge learning needs (meds, wound care, etc)   Arrange for interpreters to assist at discharge as needed     Problem: Pain  Goal: Verbalizes/displays adequate comfort level or baseline comfort level  Outcome: Progressing  Flowsheets (Taken 2/9/2023 0058)  Verbalizes/displays adequate comfort level or baseline comfort level:   Encourage patient to monitor pain and request assistance   Assess pain using appropriate pain scale   Administer analgesics based on type and severity of pain and evaluate response   Implement non-pharmacological measures as appropriate and evaluate response   Consider cultural and social influences on pain and pain management     Problem: Skin/Tissue Integrity - Adult  Goal: Skin integrity remains intact  Outcome: Progressing  Flowsheets (Taken 2/9/2023 0058)  Skin Integrity Remains Intact:   Monitor for areas of redness and/or skin breakdown   Assess vascular access sites hourly     Problem: Genitourinary - Adult  Goal: Absence of urinary retention  Outcome: Progressing  Flowsheets (Taken 2/9/2023 0058)  Absence of urinary retention:   Assess patients ability to void and empty bladder   Monitor intake/output and perform bladder scan as needed  Care plan reviewed with patient. Patient verbalize understanding of the plan of care and contribute to goal setting.

## 2023-02-09 NOTE — PLAN OF CARE
Problem: Pain  Goal: Verbalizes/displays adequate comfort level or baseline comfort level  Outcome: Progressing     Problem: Skin/Tissue Integrity - Adult  Goal: Skin integrity remains intact  Outcome: Progressing     Problem: Genitourinary - Adult  Goal: Absence of urinary retention  Outcome: Progressing

## 2023-02-10 LAB
ANION GAP SERPL CALC-SCNC: 13 MEQ/L (ref 8–16)
BUN SERPL-MCNC: 9 MG/DL (ref 7–22)
CALCIUM SERPL-MCNC: 8.5 MG/DL (ref 8.5–10.5)
CHLORIDE SERPL-SCNC: 103 MEQ/L (ref 98–111)
CO2 SERPL-SCNC: 21 MEQ/L (ref 23–33)
CREAT SERPL-MCNC: 0.6 MG/DL (ref 0.4–1.2)
GFR SERPL CREATININE-BSD FRML MDRD: > 60 ML/MIN/1.73M2
GLUCOSE SERPL-MCNC: 76 MG/DL (ref 70–108)
HCT VFR BLD AUTO: 31.7 % (ref 37–47)
HGB BLD-MCNC: 9.5 GM/DL (ref 12–16)
POTASSIUM SERPL-SCNC: 3.7 MEQ/L (ref 3.5–5.2)
SODIUM SERPL-SCNC: 137 MEQ/L (ref 135–145)

## 2023-02-10 PROCEDURE — 85018 HEMOGLOBIN: CPT

## 2023-02-10 PROCEDURE — 6360000002 HC RX W HCPCS: Performed by: SURGERY

## 2023-02-10 PROCEDURE — APPSS30 APP SPLIT SHARED TIME 16-30 MINUTES: Performed by: NURSE PRACTITIONER

## 2023-02-10 PROCEDURE — 85014 HEMATOCRIT: CPT

## 2023-02-10 PROCEDURE — 99024 POSTOP FOLLOW-UP VISIT: CPT | Performed by: NURSE PRACTITIONER

## 2023-02-10 PROCEDURE — 80048 BASIC METABOLIC PNL TOTAL CA: CPT

## 2023-02-10 PROCEDURE — 6370000000 HC RX 637 (ALT 250 FOR IP): Performed by: NURSE PRACTITIONER

## 2023-02-10 PROCEDURE — 2500000003 HC RX 250 WO HCPCS: Performed by: NURSE PRACTITIONER

## 2023-02-10 PROCEDURE — 1200000000 HC SEMI PRIVATE

## 2023-02-10 PROCEDURE — 36415 COLL VENOUS BLD VENIPUNCTURE: CPT

## 2023-02-10 PROCEDURE — 2580000003 HC RX 258: Performed by: SURGERY

## 2023-02-10 PROCEDURE — 6370000000 HC RX 637 (ALT 250 FOR IP): Performed by: SURGERY

## 2023-02-10 RX ORDER — AMOXICILLIN AND CLAVULANATE POTASSIUM 875; 125 MG/1; MG/1
1 TABLET, FILM COATED ORAL 2 TIMES DAILY
Qty: 8 TABLET | Refills: 0 | Status: SHIPPED | OUTPATIENT
Start: 2023-02-10 | End: 2023-02-14

## 2023-02-10 RX ORDER — HYDROCODONE BITARTRATE AND ACETAMINOPHEN 5; 325 MG/1; MG/1
1-2 TABLET ORAL EVERY 6 HOURS PRN
Qty: 25 TABLET | Refills: 0 | Status: SHIPPED | OUTPATIENT
Start: 2023-02-10 | End: 2023-02-17

## 2023-02-10 RX ORDER — ACETAMINOPHEN 325 MG/1
650 TABLET ORAL EVERY 6 HOURS PRN
Status: DISCONTINUED | OUTPATIENT
Start: 2023-02-10 | End: 2023-02-11 | Stop reason: HOSPADM

## 2023-02-10 RX ADMIN — HYDROCODONE BITARTRATE AND ACETAMINOPHEN 2 TABLET: 5; 325 TABLET ORAL at 10:25

## 2023-02-10 RX ADMIN — ACETAMINOPHEN 650 MG: 325 TABLET ORAL at 17:09

## 2023-02-10 RX ADMIN — NALOXEGOL OXALATE 25 MG: 25 TABLET, FILM COATED ORAL at 08:16

## 2023-02-10 RX ADMIN — MICONAZOLE NITRATE: 20 POWDER TOPICAL at 08:16

## 2023-02-10 RX ADMIN — FLUCONAZOLE, SODIUM CHLORIDE 100 MG: 2 INJECTION INTRAVENOUS at 12:56

## 2023-02-10 RX ADMIN — ENOXAPARIN SODIUM 40 MG: 100 INJECTION SUBCUTANEOUS at 08:16

## 2023-02-10 RX ADMIN — PIPERACILLIN AND TAZOBACTAM 3375 MG: 3; .375 INJECTION, POWDER, FOR SOLUTION INTRAVENOUS at 06:05

## 2023-02-10 RX ADMIN — SODIUM CHLORIDE: 9 INJECTION, SOLUTION INTRAVENOUS at 12:55

## 2023-02-10 RX ADMIN — PIPERACILLIN AND TAZOBACTAM 3375 MG: 3; .375 INJECTION, POWDER, FOR SOLUTION INTRAVENOUS at 14:44

## 2023-02-10 RX ADMIN — HYDROCODONE BITARTRATE AND ACETAMINOPHEN 1 TABLET: 5; 325 TABLET ORAL at 14:46

## 2023-02-10 RX ADMIN — MICONAZOLE NITRATE: 20 POWDER TOPICAL at 22:09

## 2023-02-10 RX ADMIN — SODIUM CHLORIDE, PRESERVATIVE FREE 10 ML: 5 INJECTION INTRAVENOUS at 22:09

## 2023-02-10 RX ADMIN — SERTRALINE 50 MG: 50 TABLET, FILM COATED ORAL at 08:16

## 2023-02-10 RX ADMIN — PIPERACILLIN AND TAZOBACTAM 3375 MG: 3; .375 INJECTION, POWDER, FOR SOLUTION INTRAVENOUS at 22:13

## 2023-02-10 RX ADMIN — HYDROCODONE BITARTRATE AND ACETAMINOPHEN 2 TABLET: 5; 325 TABLET ORAL at 06:03

## 2023-02-10 RX ADMIN — HYDROCODONE BITARTRATE AND ACETAMINOPHEN 1 TABLET: 5; 325 TABLET ORAL at 18:35

## 2023-02-10 ASSESSMENT — PAIN DESCRIPTION - ORIENTATION
ORIENTATION: LOWER;LEFT;MID
ORIENTATION: LEFT;LOWER;MID
ORIENTATION: LEFT;LOWER;MID
ORIENTATION: LEFT;MID;LOWER
ORIENTATION: LEFT;LOWER;MID
ORIENTATION: LEFT;LOWER;MID
ORIENTATION: LOWER;MID;LEFT

## 2023-02-10 ASSESSMENT — PAIN SCALES - GENERAL
PAINLEVEL_OUTOF10: 4
PAINLEVEL_OUTOF10: 7
PAINLEVEL_OUTOF10: 4
PAINLEVEL_OUTOF10: 4
PAINLEVEL_OUTOF10: 3
PAINLEVEL_OUTOF10: 3
PAINLEVEL_OUTOF10: 0
PAINLEVEL_OUTOF10: 2
PAINLEVEL_OUTOF10: 4
PAINLEVEL_OUTOF10: 3
PAINLEVEL_OUTOF10: 7
PAINLEVEL_OUTOF10: 7
PAINLEVEL_OUTOF10: 2

## 2023-02-10 ASSESSMENT — PAIN DESCRIPTION - LOCATION
LOCATION: ABDOMEN

## 2023-02-10 ASSESSMENT — PAIN - FUNCTIONAL ASSESSMENT

## 2023-02-10 ASSESSMENT — PAIN DESCRIPTION - DESCRIPTORS
DESCRIPTORS: SORE
DESCRIPTORS: STABBING
DESCRIPTORS: SHOOTING;SORE
DESCRIPTORS: SORE
DESCRIPTORS: SHARP
DESCRIPTORS: SORE

## 2023-02-10 ASSESSMENT — PAIN SCALES - WONG BAKER: WONGBAKER_NUMERICALRESPONSE: 2

## 2023-02-10 NOTE — CARE COORDINATION
2/10/23, 2:11 PM EST    DISCHARGE ON GOING EVALUATION    Lola RUBIO [facility] day: 3  Location: -16/016-A Reason for admit: Crohn's disease with complication, unspecified gastrointestinal tract location [facility]) [K50.919]  Acute [facility] [K50.912]   Procedure:   2/07/2023  Robotic ileocecectomy. Barriers to Discharge: Lovenox, Zosyn, Movantik, prn Norco, Dilaudid and Zofran, Potassium replacement protocol, easy chew diet, abdominal binder, ambulate, drain care, incentive spirometry, SCD's, up as tolerated. PCP: Lubna Oswald DO  Readmission Risk Score: 4.4%  Patient Goals/Plan/Treatment Preferences: Xander Mak is from home residing with her parents. She denies needs at discharge.

## 2023-02-10 NOTE — PLAN OF CARE
Problem: Discharge Planning  Goal: Discharge to home or other facility with appropriate resources  Outcome: Progressing  Flowsheets  Taken 2/10/2023 1549  Discharge to home or other facility with appropriate resources: Identify barriers to discharge with patient and caregiver  Taken 2/10/2023 1149  Discharge to home or other facility with appropriate resources: Identify barriers to discharge with patient and caregiver  Taken 2/10/2023 1025  Discharge to home or other facility with appropriate resources: Identify barriers to discharge with patient and caregiver  Taken 2/10/2023 0749  Discharge to home or other facility with appropriate resources: Identify barriers to discharge with patient and caregiver     Problem: Pain  Goal: Verbalizes/displays adequate comfort level or baseline comfort level  Outcome: Progressing     Problem: Skin/Tissue Integrity - Adult  Goal: Skin integrity remains intact  Outcome: Progressing  Flowsheets  Taken 2/10/2023 1549  Skin Integrity Remains Intact: Monitor for areas of redness and/or skin breakdown  Taken 2/10/2023 1307  Skin Integrity Remains Intact: Monitor for areas of redness and/or skin breakdown  Taken 2/10/2023 1149  Skin Integrity Remains Intact: Monitor for areas of redness and/or skin breakdown  Taken 2/10/2023 0749  Skin Integrity Remains Intact: Monitor for areas of redness and/or skin breakdown     Problem: Skin/Tissue Integrity - Adult  Goal: Incisions, wounds, or drain sites healing without S/S of infection  Outcome: Progressing  Flowsheets  Taken 2/10/2023 1549  Incisions, Wounds, or Drain Sites Healing Without Sign and Symptoms of Infection: ADMISSION and DAILY: Assess and document risk factors for pressure ulcer development  Taken 2/10/2023 1307  Incisions, Wounds, or Drain Sites Healing Without Sign and Symptoms of Infection: ADMISSION and DAILY: Assess and document risk factors for pressure ulcer development  Taken 2/10/2023 1149  Incisions, Wounds, or Drain Sites Healing Without Sign and Symptoms of Infection: ADMISSION and DAILY: Assess and document risk factors for pressure ulcer development  Taken 2/10/2023 0749  Incisions, Wounds, or Drain Sites Healing Without Sign and Symptoms of Infection: ADMISSION and DAILY: Assess and document risk factors for pressure ulcer development     Problem: Genitourinary - Adult  Goal: Absence of urinary retention  Outcome: Progressing  Flowsheets  Taken 2/10/2023 1549  Absence of urinary retention: Assess patients ability to void and empty bladder  Taken 2/10/2023 1149  Absence of urinary retention: Assess patients ability to void and empty bladder  Taken 2/10/2023 0749  Absence of urinary retention: Assess patients ability to void and empty bladder     Problem: ABCDS Injury Assessment  Goal: Absence of physical injury  Outcome: Progressing

## 2023-02-10 NOTE — PLAN OF CARE
Problem: Discharge Planning  Goal: Discharge to home or other facility with appropriate resources  Outcome: Progressing     Problem: Pain  Goal: Verbalizes/displays adequate comfort level or baseline comfort level  2/9/2023 2145 by Donald Chatterjee RN  Outcome: Progressing  2/9/2023 1730 by Audrey Lesches, RN  Outcome: Progressing     Problem: Skin/Tissue Integrity - Adult  Goal: Skin integrity remains intact  2/9/2023 2145 by Donald Chatterjee RN  Outcome: Progressing  2/9/2023 1730 by Audrey Lesches, RN  Outcome: Progressing  Goal: Incisions, wounds, or drain sites healing without S/S of infection  2/9/2023 2145 by Donald Chatterjee RN  Outcome: Progressing  2/9/2023 1730 by Audrey Lesches, RN  Outcome: Progressing     Problem: Genitourinary - Adult  Goal: Absence of urinary retention  2/9/2023 2145 by Donald Chatterjee RN  Outcome: Progressing  2/9/2023 1730 by Audrey Lesches, RN  Outcome: Progressing

## 2023-02-10 NOTE — PROGRESS NOTES
[facility] 12 Surgery Postoperative Progress Note    Pt Name: Ariane Schneider  Medical Record Number: [id number]  Date of Birth 2000   Today's Date: 2/10/2023    ASSESSMENT   POD # 3 Status post robotic ileocecectomy secondary to crohn's disease with ileocecal stricture   has a past medical history of Crohn's colitis ([address] Environmental allergies, and Pilonidal cyst.  PLAN   Tolerating full liquids. Increase to soft diet. Bowel function returned   Antibiotics  IV to INT  Ambulate  Replace electrolytes as needed per protocol. Pain controlled  Lovenox for DVT prophylaxis  Incisional and ROGER drain management   Labs reviewed. Hemoglobin stable. Looks good. Hopefully home over the weekend   SUBJECTIVE   Patient was stable overnight. Vs stable. No fevers. Chart reviewed. Pain better controlled today with Gianna Nice spontaneously. Denies chest discomfort or dyspnea. No N/V; (+) belching, flatus and having BMs. Loose. Tolerating full liquids. Abdominal incisions are dry and intact. Roger drain serosanguinous. CURRENT MEDICATIONS   Scheduled Meds:   miconazole   Topical BID    sertraline  50 mg Oral Daily    fluconazole  100 mg IntraVENous Q24H    sodium chloride flush  5-40 mL IntraVENous 2 times per day    enoxaparin  40 mg SubCUTAneous Daily    naloxegol  25 mg Oral Daily    piperacillin-tazobactam  3,375 mg IntraVENous Q8H     Continuous Infusions:   sodium chloride       PRN Meds:.potassium chloride **OR** potassium alternative oral replacement **OR** potassium chloride, HYDROcodone 5 mg - acetaminophen **OR** HYDROcodone 5 mg - acetaminophen, hyoscyamine, sodium chloride flush, sodium chloride, ondansetron **OR** ondansetron, HYDROmorphone **OR** HYDROmorphone  OBJECTIVE   CURRENT VITALS:  height is 5' 2\" (1.575 m) and weight is 154 lb (69.9 kg). Her oral temperature is 98.6 °F (37 °C). Her blood pressure is 115/59 (abnormal) and her pulse is 84.  Her respiration is 16 and oxygen saturation is 97%. Temperature Range (24h):Temp: 98.6 °F (37 °C) Temp  Av.4 °F (36.9 °C)  Min: 97.8 °F (36.6 °C)  Max: 98.6 °F (37 °C)  BP Range (15W): Systolic (92TUJ), GHC:506 , Min:115 , RSJ:510     Diastolic (85WZR), GYB:41, Min:55, Max:73    Pulse Range (24h): Pulse  Av.7  Min: 74  Max: 90  Respiration Range (24h): Resp  Av.4  Min: 16  Max: 19  Current Pulse Ox (24h):  SpO2: 97 %  Pulse Ox Range (24h):  SpO2  Av.5 %  Min: 72 %  Max: 99 %  Oxygen Amount and Delivery:    Incentive Spirometry Tx:            GENERAL: alert, no distress  LUNGS: clear to ausculation, without wheezes, rales or rhonci  HEART: normal rate and regular rhythm  ABDOMEN: distended, soft, incisional tenderness, bowel sounds active, TOM drain serosanguinous   INCISION: incisions are all clean, dry and intact. No bleeding. No breakdown. EXTREMITY: no cyanosis, clubbing or edema    In: 1643.3 [P.O.:120; I.V.:1345.8]  Out: 350 [Drains:350]  Date 02/10/23 0000 - 02/10/23 2359   Shift 4045-3436 8725-4987 6451-6662 24 Hour Total   INTAKE   Shift Total(mL/kg)       OUTPUT   Drains 130 50  180   Shift Total(mL/kg) 130(1.9) 50(0.7)  180(2.6)   Weight (kg) 69.9 69.9 69.9 69.9       LABS     Recent Labs     23  1448 23  0439 23  0523 02/10/23  0546   WBC  --  14.6* 8.0  --    HGB  --  10.4* 9.3* 9.5*   HCT  --  35.3* 31.9* 31.7*   PLT  --  347 274  --     137 138 137   K 3.5 4.1 3.4* 3.7    102 102 103   CO2 18* 23 25 21*   BUN 7 5* 8 9   CREATININE 0.6 0.6 0.6 0.6   MG 1.7  --   --   --    CALCIUM 8.1* 9.1 8.8 8.5        No results for input(s): PTT, INR in the last 72 hours. Invalid input(s): PT  No results for input(s): AST, ALT, BILITOT, BILIDIR, AMYLASE, LIPASE, LDH, LACTA in the last 72 hours. No results for input(s): TROPONINT in the last 72 hours.      PATHOLOGY REPORT                       ATTN: Donna Melchor Heads Copies To:   LUPILLO BERMAN     Clinical Information: CROHN'S DISEASE WITH COMPLICATION, UNSPECIFIED   GASTROINTESTINAL TRACT LOCATION [C31.434]     FINAL DIAGNOSIS:   Ileocecum, resection:     Crohn enteritis with stricture and transmural fissure with abscess   formation. Incidental endometriosis/endosalpingosis. Specimen:   COLON, ILEOCECUM     Gross Examination:   The container is labeled Lola Young, ileocecum. Received in formalin   is a segmental resection of bowel including terminal ileum, cecum, and   appendix. The specimen measures about 18 cm in length. The appendix   measures 4 cm in length x about 0.7 cm in average diameter. Sections   through the appendix reveal no discrete lesions. The specimen is   opened longitudinally revealing an ulcerated and eroded mucosal surface   in the terminal ileum. Serial cross-sections reveal an abscess cavity   adjacent to the terminal ileum. Representative sections are submitted   taken proximal to distal.  Cassette #1 - margin; cassette #2 -   appendix; and cassettes #3 through #7 - representative sections. ss.   SMW/DKR:[id number]_i     Microscopic Examination:   Sections demonstrate marked crypt architectural distortion, lamina   propria lymphoplasmacytic inflammation, basal lymphoplasmacytosis,   submucosal fibrosis, transmural lymphoid aggregates, neural hypertrophy   and occasional granulomas, consistent with Crohn enteritis. There is a   transmural fissure with an associated abscess. There is no definite   colonic involvement. The appendix is relatively unremarkable. Focal   endometriosis is present external to the muscularis propria. There is   no evidence of dysplasia or malignancy. Bettye Davila M.D., F.C. A. P     RADIOLOGY   No new imaging    Electronically signed by LUANN Bailon CNP on 2/10/2023 at 11:52 AM

## 2023-02-10 NOTE — DISCHARGE INSTRUCTIONS
DR. Bekah Quintero DISCHARGE INSTRUCTIONS    Pt Name: Akash Talavera  Medical Record Number: [id number]  Today's Date: 2/10/2023    GENERAL ANESTHESIA OR SEDATION  1. Do not drive or operate hazardous machinery for 24 hours. 2. Do not make important business or personal decisions for 24 hours. 3. Do not drink alcoholic beverages or use tobacco for 24 hours. ACTIVITY INSTRUCTIONS:  [] Rest today. Resume light to normal activity tomorrow.   [] You may resume normal activity tomorrow. Do not engage in strenuous activity that may place stress on your incision. [x] Do not drive for 3-5 days or while taking the pain medication. Avoid heavy lifting, tugging, pullings greater than 10 lbs until seen in the office. DIET INSTRUCTIONS:  [x]Begin with clear liquids. If not nauseated, may increase to a low-fat diet when you desire. Greasy and spicy foods are not advised. [x]Regular diet as tolerated. []Other:     MEDICATIONS  [x]Prescription sent with you to be used as directed. []Lortab   [x]Norco   []Percocet   []Tylenol #3   []Oxycontin  Do not drink alcohol or drive while taking these medications. You may experience dizziness or drowsiness with these medications. You may also experience constipation which can be relieved with stool softners or laxatives. **Pain medication at discharge - use only as prescribed- refills may be available to you at your follow up appointments if needed and warranted. Narcotics should be used for only short term and we highly encouraged our patients to wean off appropriately and use other means for pain such as non pharmacologic measures and over the counter tylenol or ibuprofen if no restrictions apply. We do  know that surgical pain is real and will not hesitate to help eliminate some of your discomfort.  However we will not be able to completely make you pain free and it is important to determine what pain level is tolerable for you **  [x]You may resume your daily prescription medication schedule unless otherwise specified. [x]Complete antibiotics    Use Colace and MiraLAX asneeded to prevent constipation. Do not allow yourself to become constipated. Drink at least 64 ounces of liquids per day. WOUND/DRESSING INSTRUCTIONS:  Always ensure you and your care giver clean hands before and after caring for the wound. [] Keep dressing clean and dry for 48 hours. Change when soiled or wet. [x] Allow steri-strips to fall off on their own. [x] Ice operative site for 20 minutes 4 times a day as needed. [x] May wash over incision in shower, but do not soak in a bath.  [] Take sitz bath for 20 minutes twice daily and after bowel movements. [x] Keep the abdominal binder in place during the day. May remove to shower and at night. ABDOMINAL/LAPAROSCOPIC SURGERY  [x]You are encouraged to get up and move around as this helps with circulation, prevents blood clots from forming and speeds up the healing process. Call the office if you develop pain or swelling in your legs. Do not massage sore muscles in the legs. [x]Breath deeply and cough from time to time. This helps to clear your lungs and helps prevent pneumonia. [x]Supporting your incision with a pillow or your hand helps to minimize discomfort and pain. [x]Laparoscopic patients may develop shoulder pain in the first 48 hours from the gas used during the procedure a heating pad may help alleviate this discomfort. FOLLOW-UP CARE. SPECIFICALLY WATCH FOR:   Fever over 101 degrees by mouth   Increased redness, warmth, hardness at operative site. Blood soaked dressing (small amounts of oozing may be normal.)   Increased or progressive drainage from the surgical area   Inability to urinate or blood in the urine   Pain not relieved by the medications ordered   Persistent nausea and/or vomiting, unable to retain fluids. Pain or swelling in your legs. Shortness of breath.   Call the office if you develop any of the above symptoms. FOLLOW-UP APPOINTMENT   []1 week   [x]2 weeks:    []Other    Call my office if you have any problem that concerns you [phone]. After hours, you can reach the answering service via the office phone number. IF YOU NEED IMMEDIATE ATTENTION, GO TO THE EMERGENCY ROOM AND YOUR DOCTOR WILL BE CONTACTED. Prepared by Silva Del Angel CNP for   Mine Jasso MD [address]

## 2023-02-10 NOTE — PROGRESS NOTES
Pt was in bed as her mother was visiting with her. She was dealing with crohn's disease with complication. She was hopeful and was maintaining a good attitude about it. She was encouraged and blessed.     02/10/23 1840   Encounter Summary   Encounter Overview/Reason  Initial Encounter   Service Provided For: Patient and family together   Referral/Consult From: [facility]   Last Encounter  02/10/23   Complexity of Encounter Low   Begin Time 1540   End Time  1546   Total Time Calculated 6 min   Spiritual/Emotional needs   Type Spiritual Support   Assessment/Intervention/Outcome   Assessment Calm   Intervention Active listening;Empowerment   Outcome Acceptance;Encouraged

## 2023-02-11 VITALS
SYSTOLIC BLOOD PRESSURE: 104 MMHG | RESPIRATION RATE: 16 BRPM | WEIGHT: 154 LBS | BODY MASS INDEX: 28.34 KG/M2 | DIASTOLIC BLOOD PRESSURE: 57 MMHG | HEART RATE: 75 BPM | TEMPERATURE: 97.9 F | HEIGHT: 62 IN | OXYGEN SATURATION: 96 %

## 2023-02-11 PROCEDURE — 99024 POSTOP FOLLOW-UP VISIT: CPT | Performed by: SURGERY

## 2023-02-11 PROCEDURE — 6360000002 HC RX W HCPCS: Performed by: SURGERY

## 2023-02-11 PROCEDURE — 6370000000 HC RX 637 (ALT 250 FOR IP): Performed by: NURSE PRACTITIONER

## 2023-02-11 PROCEDURE — 6370000000 HC RX 637 (ALT 250 FOR IP): Performed by: SURGERY

## 2023-02-11 PROCEDURE — 2580000003 HC RX 258: Performed by: SURGERY

## 2023-02-11 RX ADMIN — PIPERACILLIN AND TAZOBACTAM 3375 MG: 3; .375 INJECTION, POWDER, FOR SOLUTION INTRAVENOUS at 05:31

## 2023-02-11 RX ADMIN — HYDROCODONE BITARTRATE AND ACETAMINOPHEN 2 TABLET: 5; 325 TABLET ORAL at 00:14

## 2023-02-11 RX ADMIN — HYDROCODONE BITARTRATE AND ACETAMINOPHEN 2 TABLET: 5; 325 TABLET ORAL at 11:17

## 2023-02-11 RX ADMIN — HYDROCODONE BITARTRATE AND ACETAMINOPHEN 2 TABLET: 5; 325 TABLET ORAL at 05:22

## 2023-02-11 RX ADMIN — NALOXEGOL OXALATE 25 MG: 25 TABLET, FILM COATED ORAL at 07:55

## 2023-02-11 RX ADMIN — MICONAZOLE NITRATE: 20 POWDER TOPICAL at 07:54

## 2023-02-11 RX ADMIN — SERTRALINE 50 MG: 50 TABLET, FILM COATED ORAL at 07:55

## 2023-02-11 ASSESSMENT — PAIN DESCRIPTION - DESCRIPTORS
DESCRIPTORS: DISCOMFORT
DESCRIPTORS: ACHING;STABBING
DESCRIPTORS: ACHING;SHARP
DESCRIPTORS: ACHING;SHARP

## 2023-02-11 ASSESSMENT — PAIN DESCRIPTION - ORIENTATION
ORIENTATION: LEFT;LOWER
ORIENTATION: LOWER
ORIENTATION: LEFT;LOWER
ORIENTATION: LEFT;LOWER

## 2023-02-11 ASSESSMENT — PAIN SCALES - GENERAL
PAINLEVEL_OUTOF10: 1
PAINLEVEL_OUTOF10: 3
PAINLEVEL_OUTOF10: 8
PAINLEVEL_OUTOF10: 7

## 2023-02-11 ASSESSMENT — PAIN DESCRIPTION - LOCATION
LOCATION: ABDOMEN

## 2023-02-11 ASSESSMENT — PAIN SCALES - WONG BAKER: WONGBAKER_NUMERICALRESPONSE: 2

## 2023-02-11 NOTE — PROGRESS NOTES
Patient discharged at this time. All IV's removed. Discharge instructions, medication changes and follow up appointments explained at this time. All questions answered at this time. AVS given to patient and paperwork signed with this RN. All patient belongings returned. Chart broken down and placed in yellow bin.

## 2023-02-11 NOTE — PLAN OF CARE
Problem: Discharge Planning  Goal: Discharge to home or other facility with appropriate resources  Outcome: Completed  Flowsheets (Taken 2/11/2023 1205)  Discharge to home or other facility with appropriate resources:   Identify barriers to discharge with patient and caregiver   Arrange for needed discharge resources and transportation as appropriate   Identify discharge learning needs (meds, wound care, etc)     Problem: Pain  Goal: Verbalizes/displays adequate comfort level or baseline comfort level  Outcome: Completed  Flowsheets (Taken 2/11/2023 1205)  Verbalizes/displays adequate comfort level or baseline comfort level:   Encourage patient to monitor pain and request assistance   Assess pain using appropriate pain scale   Administer analgesics based on type and severity of pain and evaluate response   Implement non-pharmacological measures as appropriate and evaluate response     Problem: Skin/Tissue Integrity - Adult  Goal: Skin integrity remains intact  Outcome: Completed  Flowsheets (Taken 2/11/2023 1205)  Skin Integrity Remains Intact: Monitor for areas of redness and/or skin breakdown     Problem: Skin/Tissue Integrity - Adult  Goal: Incisions, wounds, or drain sites healing without S/S of infection  Outcome: Completed  Flowsheets (Taken 2/11/2023 1205)  Incisions, Wounds, or Drain Sites Healing Without Sign and Symptoms of Infection: Implement wound care per orders     Problem: Genitourinary - Adult  Goal: Absence of urinary retention  Outcome: Completed  Flowsheets (Taken 2/11/2023 1205)  Absence of urinary retention: Monitor intake/output and perform bladder scan as needed     Problem: ABCDS Injury Assessment  Goal: Absence of physical injury  Outcome: Completed  Flowsheets (Taken 2/11/2023 1205)  Absence of Physical Injury: Implement safety measures based on patient assessment    Care plan reviewed with patient and mother.   Patient and mother verbalize understanding of the plan of care and contribute to goal setting.

## 2023-02-11 NOTE — CARE COORDINATION
2/11/23, 1:23 PM EST    Patient goals/plan/ treatment preferences discussed by  and . Patient goals/plan/ treatment preferences reviewed with patient/ family. Patient/ family verbalize understanding of discharge plan and are in agreement with goal/plan/treatment preferences. Understanding was demonstrated using the teach back method. AVS provided by RN at time of discharge, which includes all necessary medical information pertaining to the patients current course of illness, treatment, post-discharge goals of care, and treatment preferences. Services At/After Discharge: None             Planning home today. Denied needs.

## 2023-02-13 ENCOUNTER — OFFICE VISIT (OUTPATIENT)
Dept: SURGERY | Age: 23
End: 2023-02-13

## 2023-02-13 DIAGNOSIS — Z48.03 CHANGE OR REMOVAL OF DRAINS: Primary | ICD-10-CM

## 2023-02-13 PROCEDURE — 99024 POSTOP FOLLOW-UP VISIT: CPT | Performed by: NURSE PRACTITIONER

## 2023-02-13 NOTE — PROGRESS NOTES
Patient here for wound check and drain removal-drain emptied 40mls pink tinged fluid. Ok per Marycruz Perez to remove. Stitch cut and removed. Roger removed. 4x4 applied and secured in place with tape. Patient has follow up in office on 2/17/2023.

## 2023-02-17 ENCOUNTER — OFFICE VISIT (OUTPATIENT)
Dept: SURGERY | Age: 23
End: 2023-02-17

## 2023-02-17 VITALS
SYSTOLIC BLOOD PRESSURE: 106 MMHG | DIASTOLIC BLOOD PRESSURE: 78 MMHG | HEIGHT: 62 IN | BODY MASS INDEX: 27.82 KG/M2 | RESPIRATION RATE: 18 BRPM | TEMPERATURE: 97.3 F | WEIGHT: 151.2 LBS | HEART RATE: 100 BPM | OXYGEN SATURATION: 99 %

## 2023-02-17 DIAGNOSIS — Z90.49 S/P SMALL BOWEL RESECTION: Primary | ICD-10-CM

## 2023-02-17 PROCEDURE — 99024 POSTOP FOLLOW-UP VISIT: CPT | Performed by: NURSE PRACTITIONER

## 2023-02-17 NOTE — LETTER
[address] [facility]  [place] [address] [facility] [address]  Phone: [phone]  Fax: [phone]    LUANN Timmons CNP        February 17, 2023     Patient: Nohelia Ames   YOB: 2000   Date of Visit: 2/17/2023       To Whom It May Concern:     Vaishali Woodson was seen in our office today for surgery follow up. She will return to our office on 3/3/23 and will re-evaluate. She will tentatively remain off work for 6-8 weeks which is between March 22nd-April 5th. If you have any questions or concerns, please don't hesitate to call.     Sincerely,        LUANN Timmons CNP

## 2023-02-17 NOTE — DISCHARGE SUMMARY
[facility] SUMMARY       Pt Name: Debra Holter  MRN: [id number]  YOB: 2000  Primary Care Physician: Chris Sunshine DO    Admit date:  2/7/2023  7:32 AM      Discharge date:  2/11/2023  2:07 PM    Admitting Diagnosis:   1. S/P partial resection of colon    2. Crohn's disease with complication, unspecified gastrointestinal tract location [facility])        Discharge Diagnosis:   1. S/P partial resection of colon    2. Crohn's disease with complication, unspecified gastrointestinal tract location [facility])        Admitting Service: General Surgery, Cecelia Mendieta DO      Consultants:  none    History and Physical:  Yarely Head is a 80-year-old female who presents for initial evaluation secondary to ileocecal stricture secondary to Crohn's disease. She has had Crohn's now for years. Has recently been on Entyvio and in the past on Remicade. Unfortunately, she has required persistent use of steroids in order to improve symptoms. Generalized abdominal pain. Worse in the lower abdomen. Bloated/distention. Fatigue. Some dysuria. No hematochezia or melena. No new bowel function change. No chest pain or shortness of breath. Colonoscopy is demonstrated significant ulcerated stricture in the area of concern. She has [facility]. Patient and GI service feel she is now at the time of resection given persistent flareups and symptoms. Procedures/Diagnostic Tests:  [facility] Course: patient had an uneventful ileocecectomy on 2/7. Recovery was straight-forward and patient was discharged on POD #4 after tolerating a general diet.     Discharge Condition: stable    Disposition: home    Labs:    Discharge Instructions:  Discharge Medications:        Medication List        START taking these medications      HYDROcodone-acetaminophen 5-325 MG per tablet  Commonly known as: NORCO  Take 1-2 tablets by mouth every 6 hours as needed for Pain for up to 7 days. Max Daily Amount: 8 tablets     miconazole 2 % powder  Commonly known as: MICOTIN  Apply topically 2 times daily. CONTINUE taking these medications      ENTYVIO IV     hyoscyamine 125 MCG sublingual tablet  Commonly known as: LEVSIN/SL     loratadine-pseudoephedrine  MG per extended release tablet  Commonly known as: CLARITIN-D 24HR  Take 1 tablet by mouth daily     ondansetron 4 MG disintegrating tablet  Commonly known as: Zofran ODT  Take 1 tablet by mouth every 8 hours as needed for Nausea            STOP taking these medications      metroNIDAZOLE 500 MG tablet  Commonly known as: FLAGYL     predniSONE 10 MG tablet  Commonly known as: Lea Dub your doctor about these medications      amoxicillin-clavulanate 875-125 MG per tablet  Commonly known as: Augmentin  Take 1 tablet by mouth 2 times daily for 4 days  Ask about: Should I take this medication?     sertraline 50 MG tablet  Commonly known as: ZOLOFT               Where to Get Your Medications        These medications were sent to [address] [place] [address] [id number] [place] [address] [address]  [address] [facility] [address] [facility]      Phone: [phone]   amoxicillin-clavulanate 875-125 MG per tablet  HYDROcodone-acetaminophen 5-325 MG per tablet       Information about where to get these medications is not yet available    Ask your nurse or doctor about these medications  miconazole 2 % powder         Diet: General/Regular diet as tolerated    Activity: activity as tolerated and no lifting more than 10-20 lbs for next two weeks    Wound Care: None     Follow-up:  in 4 weeks with Elfida Ahumada, DO  Follow up with Chelsea Armas MD in 1 weeks.     Jane Paris DO  Electronincally signed 2/17/2023 at 6:06 AM    A total of 32 minutes was spent in preparing the patient for discharge with greater than 50% of the time involved with education, counseling and coordinating care

## 2023-02-18 ASSESSMENT — ENCOUNTER SYMPTOMS
ALLERGIC/IMMUNOLOGIC NEGATIVE: 1
NAUSEA: 0
EYE PAIN: 0
SHORTNESS OF BREATH: 0
ABDOMINAL DISTENTION: 0
EYE ITCHING: 0
RHINORRHEA: 0
DIARRHEA: 0
VOMITING: 0
SORE THROAT: 0
CHOKING: 0
EYE DISCHARGE: 0
ABDOMINAL PAIN: 1
COUGH: 0
CONSTIPATION: 0
COLOR CHANGE: 0
PHOTOPHOBIA: 0
CHEST TIGHTNESS: 0
ANAL BLEEDING: 0
BLOOD IN STOOL: 0
APNEA: 0
SINUS PRESSURE: 0
WHEEZING: 0
BACK PAIN: 0
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
## 2023-02-24 ENCOUNTER — TELEPHONE (OUTPATIENT)
Dept: SURGERY | Age: 23
End: 2023-02-24

## 2023-02-24 NOTE — TELEPHONE ENCOUNTER
Patient c/o a long raised lump above her bowel resection scar, underneath her stomach that hurts. She denies drainage or seeping. It hurts mainly when she moves or twists. Advised her that this is nothing to worry about. She should call if she notices any signs of infection, drainage or if the pain worsens.   Her follow up appointment is on 03-

## 2023-03-03 ENCOUNTER — OFFICE VISIT (OUTPATIENT)
Dept: SURGERY | Age: 23
End: 2023-03-03

## 2023-03-03 VITALS
WEIGHT: 152.5 LBS | DIASTOLIC BLOOD PRESSURE: 72 MMHG | OXYGEN SATURATION: 98 % | BODY MASS INDEX: 27.89 KG/M2 | TEMPERATURE: 96.9 F | HEART RATE: 91 BPM | SYSTOLIC BLOOD PRESSURE: 114 MMHG

## 2023-03-03 DIAGNOSIS — Z90.49 S/P SMALL BOWEL RESECTION: Primary | ICD-10-CM

## 2023-03-03 PROCEDURE — 99024 POSTOP FOLLOW-UP VISIT: CPT | Performed by: NURSE PRACTITIONER
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
## 2023-03-06 ASSESSMENT — ENCOUNTER SYMPTOMS
COLOR CHANGE: 0
APNEA: 0
WHEEZING: 0
ABDOMINAL PAIN: 1
ALLERGIC/IMMUNOLOGIC NEGATIVE: 1
DIARRHEA: 0
SINUS PRESSURE: 0
CHEST TIGHTNESS: 0
COUGH: 0
ANAL BLEEDING: 0
ABDOMINAL DISTENTION: 0
BACK PAIN: 0
PHOTOPHOBIA: 0
RHINORRHEA: 0
BLOOD IN STOOL: 0
EYE DISCHARGE: 0
VOMITING: 0
CHOKING: 0
NAUSEA: 0
EYE PAIN: 0
EYE ITCHING: 0
CONSTIPATION: 0
SHORTNESS OF BREATH: 0
SORE THROAT: 0

## 2023-03-20 ENCOUNTER — OFFICE VISIT (OUTPATIENT)
Dept: SURGERY | Age: 23
End: 2023-03-20

## 2023-03-20 VITALS
TEMPERATURE: 96.8 F | BODY MASS INDEX: 28.52 KG/M2 | OXYGEN SATURATION: 100 % | WEIGHT: 155 LBS | DIASTOLIC BLOOD PRESSURE: 66 MMHG | HEIGHT: 62 IN | HEART RATE: 77 BPM | SYSTOLIC BLOOD PRESSURE: 113 MMHG

## 2023-03-20 DIAGNOSIS — Z90.49 S/P SMALL BOWEL RESECTION: Primary | ICD-10-CM

## 2023-03-20 DIAGNOSIS — T81.30XA SPITTING SUTURE, INITIAL ENCOUNTER: ICD-10-CM

## 2023-03-20 PROCEDURE — 99024 POSTOP FOLLOW-UP VISIT: CPT | Performed by: NURSE PRACTITIONER

## 2023-03-20 ASSESSMENT — ENCOUNTER SYMPTOMS
APNEA: 0
EYE DISCHARGE: 0
SINUS PRESSURE: 0
BACK PAIN: 0
ANAL BLEEDING: 0
CONSTIPATION: 0
EYE ITCHING: 0
NAUSEA: 0
SHORTNESS OF BREATH: 0
SORE THROAT: 0
BLOOD IN STOOL: 0
CHEST TIGHTNESS: 0
WHEEZING: 0
ABDOMINAL PAIN: 1
COUGH: 0
RHINORRHEA: 0
COLOR CHANGE: 0
PHOTOPHOBIA: 0
VOMITING: 0
ABDOMINAL DISTENTION: 0
CHOKING: 0
DIARRHEA: 0
EYE PAIN: 0
ALLERGIC/IMMUNOLOGIC NEGATIVE: 1

## 2023-03-20 NOTE — PROGRESS NOTES
[address] [address]  Dept: [phone]  Dept Fax: [phone]  Loc: [phone]    Visit Date: 3/20/2023    Shanta Roberto is a 25 y.o. female who presents today for:  Chief Complaint   Patient presents with    Post-Op Check      S/P Robotic ileocecectomy 2/7/23 last seen 3/3/23           HPI:     HPI    Xander Mak is a 23-year old female patient who presents for follow up status post robotic ileocecectomy 6 weeks ago with Dr. Mack Escobedo. Patient had ileocecal stricture from crohn's disease. Pathology did demonstrate an abscess. Completed course of oral antibiotics postoperative. She is doing overall well. Weaned off the narcotics. Old TOM drain site is healed. Abdominal pain is improving. Still has some right sided abdominal pain with certain movements but continues to improve weekly. Abdominal incisions are without signs of infection. Lower extraction site still has a couple small opening areas where they are spitting sutures. Incision otherwise well approximated. No significant breakdown. Sutures were clipped and removed. Hopefully will begin to heal better. Bloating better. Appetite improving. No nausea or vomiting. Bowel function doing pretty well. She is going daily. No melena or hematochezia. No fevers or chills. No urinary complaints. No SOB or chest pain. No lightheadedness or dizziness. No calf pain or swelling. Would like to wait to return to work until incision healed.      Past Medical History:   Diagnosis Date    Crohn's colitis ([facility] [address] 2016    Environmental allergies     Pilonidal cyst       Past Surgical History:   Procedure Laterality Date    COLONOSCOPY      COLONOSCOPY N/A 11/22/2022    COLONOSCOPY POLYPECTOMY SNARE/COLD BIOPSY performed by Isaac De Santiago MD at [address] [facility] N/A 2/7/2023    Robotic Ileocecetomy performed by Leyla Diamond MD at [address] [place] [address]

## 2023-04-04 NOTE — PROGRESS NOTES
concerns. Follow up with GI. Had first infusion yesterday. Appointments as directed.      Electronically signed by LUANN Mark CNP on 4/7/2023 at 10:03 AM

## 2023-04-05 ENCOUNTER — OFFICE VISIT (OUTPATIENT)
Dept: SURGERY | Age: 23
End: 2023-04-05

## 2023-04-05 VITALS
BODY MASS INDEX: 28.84 KG/M2 | HEIGHT: 62 IN | SYSTOLIC BLOOD PRESSURE: 108 MMHG | TEMPERATURE: 97.7 F | WEIGHT: 156.7 LBS | DIASTOLIC BLOOD PRESSURE: 64 MMHG | OXYGEN SATURATION: 98 % | HEART RATE: 102 BPM

## 2023-04-05 DIAGNOSIS — Z90.49 S/P SMALL BOWEL RESECTION: Primary | ICD-10-CM

## 2023-04-05 PROCEDURE — 99024 POSTOP FOLLOW-UP VISIT: CPT | Performed by: NURSE PRACTITIONER

## 2023-04-07 ASSESSMENT — ENCOUNTER SYMPTOMS
APNEA: 0
COUGH: 0
SORE THROAT: 0
EYE ITCHING: 0
BLOOD IN STOOL: 0
SINUS PRESSURE: 0
WHEEZING: 0
NAUSEA: 0
COLOR CHANGE: 0
EYE DISCHARGE: 0
CONSTIPATION: 0
ABDOMINAL DISTENTION: 0
ALLERGIC/IMMUNOLOGIC NEGATIVE: 1
CHOKING: 0
RHINORRHEA: 0
VOMITING: 0
CHEST TIGHTNESS: 0
BACK PAIN: 0
DIARRHEA: 0
SHORTNESS OF BREATH: 0
ANAL BLEEDING: 0
EYE PAIN: 0
PHOTOPHOBIA: 0
ABDOMINAL PAIN: 1

## 2023-10-06 ENCOUNTER — HOSPITAL ENCOUNTER (EMERGENCY)
Age: 23
Discharge: HOME | End: 2023-10-06
Payer: COMMERCIAL

## 2023-10-06 VITALS
BODY MASS INDEX: 30.36 KG/M2 | SYSTOLIC BLOOD PRESSURE: 138 MMHG | HEART RATE: 86 BPM | HEIGHT: 62 IN | DIASTOLIC BLOOD PRESSURE: 86 MMHG | WEIGHT: 165 LBS | RESPIRATION RATE: 16 BRPM | OXYGEN SATURATION: 99 % | TEMPERATURE: 98.7 F

## 2023-10-06 DIAGNOSIS — B34.9 VIRAL ILLNESS: Primary | ICD-10-CM

## 2023-10-06 LAB — S PYO AG THROAT QL: NEGATIVE

## 2023-10-06 PROCEDURE — 99212 OFFICE O/P EST SF 10 MIN: CPT | Performed by: EMERGENCY MEDICINE

## 2023-10-06 PROCEDURE — 99213 OFFICE O/P EST LOW 20 MIN: CPT

## 2023-10-06 PROCEDURE — 87651 STREP A DNA AMP PROBE: CPT

## 2023-10-06 ASSESSMENT — PAIN SCALES - GENERAL: PAINLEVEL_OUTOF10: 4

## 2023-10-06 ASSESSMENT — PAIN DESCRIPTION - LOCATION: LOCATION: HEAD;NECK;THROAT

## 2023-10-06 ASSESSMENT — PAIN - FUNCTIONAL ASSESSMENT: PAIN_FUNCTIONAL_ASSESSMENT: 0-10

## 2023-10-06 ASSESSMENT — PAIN DESCRIPTION - PAIN TYPE: TYPE: ACUTE PAIN

## 2023-10-06 ASSESSMENT — PAIN DESCRIPTION - FREQUENCY: FREQUENCY: INTERMITTENT

## 2023-10-06 ASSESSMENT — ENCOUNTER SYMPTOMS
RHINORRHEA: 1
SORE THROAT: 1

## 2023-10-06 ASSESSMENT — PAIN DESCRIPTION - ONSET: ONSET: ON-GOING

## 2023-10-06 NOTE — DISCHARGE INSTRUCTIONS
Continue Tylenol, 2 tablets every 6 hours as needed for body aches or fever      Drink plenty of fluids    Over-the-counter medications as needed for treatment of symptoms    Follow-up with primary care provider in 3 days as planned    Return for worsening symptoms, uncontrolled fever or any new concerns

## 2023-10-06 NOTE — ED PROVIDER NOTES
[address]  Urgent Care Encounter       CHIEF COMPLAINT     No chief complaint on file. Nurses Notes reviewed and I agree except as noted in the HPI. HISTORY OF PRESENT ILLNESS   Narinder Thornton is a 21 y.o. female who presents for complaints of congestion, generalized body ache, sore throat, headache. She states that she has pain in her neck and her upper back. She has taken Tylenol earlier today with some relief of symptoms. The patient does have a history of Crohn's disease. She takes Entyvio every 3 months. She cannot take NSAIDs due to her Crohn's disease. The patient did leave work today because of her symptoms. She called her primary care office but she cannot be seen until Monday of next week. She does have an appointment for October 9, 2023. HPI    REVIEW OF SYSTEMS     Review of Systems   Constitutional:  Positive for fatigue. Negative for activity change and fever. HENT:  Positive for congestion, rhinorrhea and sore throat. Musculoskeletal:  Positive for neck pain. PAST MEDICAL HISTORY         Diagnosis Date    Crohn's colitis ([address]) 2016    Environmental allergies     Pilonidal cyst        SURGICALHISTORY     Patient  has a past surgical history that includes other surgical history (12/05/2017); Colonoscopy; Pilonidal cyst excision (N/A, 12/5/2019); Colonoscopy (N/A, 11/22/2022); and hemicolectomy (N/A, 2/7/2023). CURRENT MEDICATIONS       Discharge Medication List as of 10/6/2023  5:27 PM        CONTINUE these medications which have NOT CHANGED    Details   miconazole (MICOTIN) 2 % powder Apply topically 2 times daily. , Disp-45 g, R-1, NO PRINT      hyoscyamine (LEVSIN/SL) 125 MCG sublingual tablet take 1 tablet by mouth twice a day if needed for abdominal painHistorical Med      Vedolizumab (ENTYVIO IV) Infuse intravenously Every 3 monthsHistorical Med      ondansetron (ZOFRAN ODT) 4 MG disintegrating tablet Take 1 tablet by mouth every 8 hours

## 2023-10-12 ENCOUNTER — TELEPHONE (OUTPATIENT)
Dept: SURGERY | Age: 23
End: 2023-10-12

## 2023-10-12 RX ORDER — SULFAMETHOXAZOLE AND TRIMETHOPRIM 800; 160 MG/1; MG/1
1 TABLET ORAL 2 TIMES DAILY
Qty: 20 TABLET | Refills: 0 | Status: SHIPPED | OUTPATIENT
Start: 2023-10-12 | End: 2023-10-22

## 2023-10-12 NOTE — TELEPHONE ENCOUNTER
Rx sent, pt notified. Advised to call office with any allergic reaction.  Also asked to call after 10 days of ATB completed and let us know if any better

## 2023-10-12 NOTE — TELEPHONE ENCOUNTER
Pt called office and LM on office VM, stated she has a pilonidal  dimple? Wondering if she needs to have it removed or not. Its not painful. She had a pilonidal cyst removed in past.    Tried to call pt back but VM full at this time.

## 2023-10-12 NOTE — TELEPHONE ENCOUNTER
Pt called office back stating she thinks she has another pilonidal cyst again. She stated it isnt real painful but feels like \"bad\" dry skin, no drainage. Pt DOES NOT want to have surgery. Please advise.      2/7/23--Robotic ileocecectomy  12/05/2019-PILONIDAL CYSTECTOMY    Ph[facility] [address]--loaded

## 2024-01-16 ENCOUNTER — HOSPITAL ENCOUNTER (EMERGENCY)
Age: 24
Discharge: HOME | End: 2024-01-16
Payer: COMMERCIAL

## 2024-01-16 VITALS
WEIGHT: 170 LBS | BODY MASS INDEX: 31.28 KG/M2 | HEART RATE: 86 BPM | HEIGHT: 62 IN | RESPIRATION RATE: 16 BRPM | SYSTOLIC BLOOD PRESSURE: 128 MMHG | OXYGEN SATURATION: 97 % | TEMPERATURE: 97.2 F | DIASTOLIC BLOOD PRESSURE: 86 MMHG

## 2024-01-16 DIAGNOSIS — J06.9 ACUTE UPPER RESPIRATORY INFECTION: Primary | ICD-10-CM

## 2024-01-16 LAB
S PYO AG THROAT QL: NEGATIVE
SARS-COV-2 RDRP RESP QL NAA+PROBE: NOT  DETECTED

## 2024-01-16 PROCEDURE — 87635 SARS-COV-2 COVID-19 AMP PRB: CPT

## 2024-01-16 PROCEDURE — 99213 OFFICE O/P EST LOW 20 MIN: CPT

## 2024-01-16 PROCEDURE — 99213 OFFICE O/P EST LOW 20 MIN: CPT | Performed by: NURSE PRACTITIONER

## 2024-01-16 PROCEDURE — 87651 STREP A DNA AMP PROBE: CPT

## 2024-01-16 ASSESSMENT — PAIN DESCRIPTION - FREQUENCY: FREQUENCY: CONTINUOUS

## 2024-01-16 ASSESSMENT — ENCOUNTER SYMPTOMS
EYE REDNESS: 0
RHINORRHEA: 0
SHORTNESS OF BREATH: 0
VOMITING: 0
NAUSEA: 0
TROUBLE SWALLOWING: 0
CHEST TIGHTNESS: 0
SORE THROAT: 1
DIARRHEA: 0
WHEEZING: 0
EYE DISCHARGE: 0
COUGH: 1

## 2024-01-16 ASSESSMENT — PAIN SCALES - GENERAL: PAINLEVEL_OUTOF10: 7

## 2024-01-16 ASSESSMENT — PAIN DESCRIPTION - DESCRIPTORS
DESCRIPTORS: ACHING
DESCRIPTORS_2: SHARP

## 2024-01-16 ASSESSMENT — PAIN DESCRIPTION - LOCATION
LOCATION_2: THROAT
LOCATION: HEAD

## 2024-01-16 ASSESSMENT — PAIN DESCRIPTION - INTENSITY: RATING_2: 6

## 2024-01-16 ASSESSMENT — PAIN DESCRIPTION - PAIN TYPE: TYPE: ACUTE PAIN

## 2024-01-16 ASSESSMENT — PAIN - FUNCTIONAL ASSESSMENT
PAIN_FUNCTIONAL_ASSESSMENT_SITE2: ACTIVITIES ARE NOT PREVENTED
PAIN_FUNCTIONAL_ASSESSMENT: 0-10

## 2024-01-16 ASSESSMENT — PAIN DESCRIPTION - ONSET: ONSET: GRADUAL

## 2024-01-16 NOTE — ED PROVIDER NOTES
[facility] URGENT CARE  Urgent Care Encounter      CHIEF COMPLAINT       Chief Complaint   Patient presents with    Pharyngitis    Headache       Nurses Notes reviewed and I agree except as noted in the HPI.  HISTORY OF PRESENT ILLNESS   Lola Young is a 23 y.o. female who presents for evaluation of sore throat.  Onset yesterday, unchanged.  Sore throat is aching, continuous.  Associated headache, no worse headache of life.  No travel.  No known exposure to COVID, strep, flu.  No current treatment.    REVIEW OF SYSTEMS     Review of Systems   Constitutional:  Negative for chills, diaphoresis, fatigue and fever.   HENT:  Positive for sore throat. Negative for congestion, ear pain, rhinorrhea and trouble swallowing.    Eyes:  Negative for discharge and redness.   Respiratory:  Positive for cough (dry). Negative for chest tightness, shortness of breath and wheezing.    Cardiovascular:  Negative for chest pain.   Gastrointestinal:  Negative for diarrhea, nausea and vomiting.   Genitourinary:  Negative for decreased urine volume.   Musculoskeletal:  Negative for neck pain and neck stiffness.   Skin:  Negative for rash.   Neurological:  Positive for headaches.   Hematological:  Negative for adenopathy.   Psychiatric/Behavioral:  Negative for sleep disturbance.        PAST MEDICAL HISTORY         Diagnosis Date    Crohn's colitis (HCC) 2016    Environmental allergies     Pilonidal cyst        SURGICAL HISTORY     Patient  has a past surgical history that includes other surgical history (12/05/2017); Colonoscopy; Pilonidal cyst excision (N/A, 12/5/2019); Colonoscopy (N/A, 11/22/2022); and hemicolectomy (N/A, 2/7/2023).    CURRENT MEDICATIONS       Discharge Medication List as of 1/16/2024  3:16 PM        CONTINUE these medications which have NOT CHANGED    Details   miconazole (MICOTIN) 2 % powder Apply topically 2 times daily., Disp-45 g, R-1, NO PRINT      hyoscyamine (LEVSIN/SL) 125 MCG sublingual tablet  erythema or uvula swelling.      Tonsils: No tonsillar exudate or tonsillar abscesses. 1+ on the right. 1+ on the left.   Eyes:      General: No scleral icterus.     Conjunctiva/sclera:      Right eye: Right conjunctiva is not injected. No hemorrhage.     Left eye: Left conjunctiva is not injected. No hemorrhage.  Neck:      Thyroid: No thyromegaly.      Trachea: Trachea normal.   Cardiovascular:      Rate and Rhythm: Normal rate and regular rhythm. No extrasystoles are present.     Chest Wall: PMI is not displaced.      Heart sounds: Normal heart sounds. No murmur heard.     No friction rub. No gallop.   Pulmonary:      Effort: Pulmonary effort is normal. No respiratory distress.      Breath sounds: Normal breath sounds.   Musculoskeletal:      Cervical back: Normal range of motion and neck supple.   Lymphadenopathy:      Head:      Right side of head: No submental, submandibular, tonsillar or occipital adenopathy.      Left side of head: No submental, submandibular, tonsillar or occipital adenopathy.      Cervical: No cervical adenopathy.      Upper Body:      Right upper body: No supraclavicular adenopathy.      Left upper body: No supraclavicular adenopathy.   Skin:     General: Skin is warm and dry.      Capillary Refill: Capillary refill takes less than 2 seconds.      Coloration: Skin is not pale.      Findings: No rash.   Neurological:      Mental Status: She is alert and oriented to person, place, and time. She is not disoriented.   Psychiatric:         Behavior: Behavior is cooperative.         DIAGNOSTIC RESULTS   Labs:  Results for orders placed or performed during the hospital encounter of 01/16/24   Strep Screen Group A Throat   Result Value Ref Range    Rapid Strep A Screen NEGATIVE    COVID-19, Rapid   Result Value Ref Range    SARS-CoV-2, SARAH NOT  DETECTED NOT DETECTED       IMAGING:  No orders to display      URGENT CARE COURSE:     Vitals:    01/16/24 1440   BP: 128/86   Pulse: 86   Resp: 16

## 2024-01-25 ENCOUNTER — HOSPITAL ENCOUNTER (EMERGENCY)
Age: 24
Discharge: HOME | End: 2024-01-25
Payer: COMMERCIAL

## 2024-01-25 VITALS
BODY MASS INDEX: 31.28 KG/M2 | TEMPERATURE: 97.8 F | HEART RATE: 104 BPM | HEIGHT: 62 IN | SYSTOLIC BLOOD PRESSURE: 145 MMHG | RESPIRATION RATE: 20 BRPM | WEIGHT: 170 LBS | OXYGEN SATURATION: 98 % | DIASTOLIC BLOOD PRESSURE: 96 MMHG

## 2024-01-25 DIAGNOSIS — L02.91 ABSCESS: Primary | ICD-10-CM

## 2024-01-25 PROCEDURE — 10140 I&D HMTMA SEROMA/FLUID COLLJ: CPT

## 2024-01-25 PROCEDURE — 99283 EMERGENCY DEPT VISIT LOW MDM: CPT

## 2024-01-25 RX ORDER — LIDOCAINE HYDROCHLORIDE AND EPINEPHRINE 10; 10 MG/ML; UG/ML
20 INJECTION, SOLUTION INFILTRATION; PERINEURAL ONCE
Status: DISCONTINUED | OUTPATIENT
Start: 2024-01-25 | End: 2024-01-25 | Stop reason: HOSPADM

## 2024-01-25 RX ORDER — CEPHALEXIN 500 MG/1
500 CAPSULE ORAL 4 TIMES DAILY
Qty: 40 CAPSULE | Refills: 0 | Status: SHIPPED | OUTPATIENT
Start: 2024-01-25 | End: 2024-02-04

## 2024-01-25 RX ORDER — HYDROCODONE BITARTRATE AND ACETAMINOPHEN 5; 325 MG/1; MG/1
1 TABLET ORAL EVERY 6 HOURS PRN
Qty: 12 TABLET | Refills: 0 | Status: SHIPPED | OUTPATIENT
Start: 2024-01-25 | End: 2024-01-28

## 2024-01-25 RX ORDER — DOXYCYCLINE HYCLATE 100 MG
100 TABLET ORAL 2 TIMES DAILY
Qty: 20 TABLET | Refills: 0 | Status: SHIPPED | OUTPATIENT
Start: 2024-01-25 | End: 2024-02-04

## 2024-01-25 ASSESSMENT — PAIN DESCRIPTION - ORIENTATION: ORIENTATION: MID

## 2024-01-25 ASSESSMENT — PAIN - FUNCTIONAL ASSESSMENT: PAIN_FUNCTIONAL_ASSESSMENT: 0-10

## 2024-01-25 ASSESSMENT — PAIN SCALES - GENERAL: PAINLEVEL_OUTOF10: 8

## 2024-01-25 ASSESSMENT — PAIN DESCRIPTION - LOCATION: LOCATION: BUTTOCKS

## 2024-01-25 NOTE — ED TRIAGE NOTES
Patient to ED from home c/o pilonidal cyst. Pt states she has seen Dr Ellsworth for this before and he lanced and drained it. Told her if it kept returning he would have to remove it in surgery. Pt has appointment scheduled for next Wednesday to be seen but states the pain has worsened. Rates pain 8/10. Peter SILVERMAN at bedside for assessment.
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
## 2024-01-31 ENCOUNTER — OFFICE VISIT (OUTPATIENT)
Dept: SURGERY | Age: 24
End: 2024-01-31
Payer: COMMERCIAL

## 2024-01-31 VITALS
SYSTOLIC BLOOD PRESSURE: 118 MMHG | HEART RATE: 95 BPM | HEIGHT: 62 IN | DIASTOLIC BLOOD PRESSURE: 60 MMHG | TEMPERATURE: 97.4 F | WEIGHT: 180.2 LBS | BODY MASS INDEX: 33.16 KG/M2 | RESPIRATION RATE: 16 BRPM

## 2024-01-31 DIAGNOSIS — L05.91 PILONIDAL CYST: Primary | ICD-10-CM

## 2024-01-31 PROCEDURE — 99213 OFFICE O/P EST LOW 20 MIN: CPT | Performed by: SURGERY

## 2024-02-06 ASSESSMENT — ENCOUNTER SYMPTOMS
APNEA: 0
COUGH: 0
BLOOD IN STOOL: 0
ALLERGIC/IMMUNOLOGIC NEGATIVE: 1
WHEEZING: 0
VOMITING: 0
EYE PAIN: 0
SHORTNESS OF BREATH: 0
CHEST TIGHTNESS: 0
EYE REDNESS: 0
SORE THROAT: 0
EYE DISCHARGE: 0
FACIAL SWELLING: 0
TROUBLE SWALLOWING: 0
SINUS PRESSURE: 0
ANAL BLEEDING: 0
COLOR CHANGE: 0
ABDOMINAL DISTENTION: 0
VOICE CHANGE: 0
PHOTOPHOBIA: 0
BACK PAIN: 0
RHINORRHEA: 0
STRIDOR: 0
CHOKING: 0
RECTAL PAIN: 0
NAUSEA: 0
CONSTIPATION: 0
ABDOMINAL PAIN: 0
DIARRHEA: 0
EYE ITCHING: 0

## 2024-02-06 NOTE — PROGRESS NOTES
Lola Young (:  2000)     ASSESSMENT:  1.  Recurrent pilonidal cystic abscess status post I&D  2.  Crohn's disease status post ileocecectomy (2023)    PLAN:  1.  Finish oral antibiotics  2.  Wound care as directed  3.  Abscess resolving.  No need for further debridement or I&D at this time.  4.  Discussed pros and cons of surgical intervention with excision versus observation.  All questions answered.  She states she is doing better and would like only conservative management if at all possible.  If it recurs again in the near future then possibly excised at that time.  5.  Restrictions discussed.  6.  Follow-up with PCP as directed  7.  Follow-up as needed.  8.  Signs and symptoms reviewed with patient that would be concerning and need her to return to office for re-evaluation.  Patient states She will call if She has questions or concerns.    SUBJECTIVE/OBJECTIVE:    Chief Complaint   Patient presents with    Surgical Consult     Est pt last seen  - Pilonidal cyst, s/p I&D in ER 24, placed on Keflex and Doxycycline     HPI  Lola is a 23-year-old female who presents for initial evaluation due to recent pilonidal cystic abscess requiring I&D in the emergency department on .  She states this was the first time that it really swelled up and got infected for quite some time.  Was placed on Keflex and doxycycline.  She admits that it is healing up well.  No more drainage.  Minimal discomfort.  Tolerating diet.  No change in bowel function.  No hematochezia or melena.  No new urinary complaints.  No other skin or subcutaneous infections or lesions that she is aware of.  History of Crohn's disease requiring ileocecectomy 2023 due to a Crohn's stricture.  Has been doing well with this since surgery.  No chest pain or shortness of breath.  No fever, chills or sweats.  Overall, doing well now.    Review of Systems   Constitutional:  Negative for activity change, appetite

## 2024-05-29 ENCOUNTER — TELEPHONE (OUTPATIENT)
Dept: SURGERY | Age: 24
End: 2024-05-29

## 2024-05-29 RX ORDER — SULFAMETHOXAZOLE AND TRIMETHOPRIM 800; 160 MG/1; MG/1
1 TABLET ORAL 2 TIMES DAILY
Qty: 20 TABLET | Refills: 1 | Status: SHIPPED | OUTPATIENT
Start: 2024-05-29 | End: 2024-06-18

## 2024-05-29 NOTE — TELEPHONE ENCOUNTER
Pt called and states she thinks her pilonidal cyst is coming back.  You told her you would give her an antibiotic if this happened.  Will you send antibiotic?  If so, which one?

## 2024-07-08 ENCOUNTER — HOSPITAL ENCOUNTER (EMERGENCY)
Age: 24
Discharge: HOME | End: 2024-07-09
Payer: COMMERCIAL

## 2024-07-08 VITALS
SYSTOLIC BLOOD PRESSURE: 157 MMHG | DIASTOLIC BLOOD PRESSURE: 90 MMHG | HEART RATE: 117 BPM | TEMPERATURE: 99 F | OXYGEN SATURATION: 98 % | RESPIRATION RATE: 18 BRPM

## 2024-07-08 DIAGNOSIS — L50.9 URTICARIA: Primary | ICD-10-CM

## 2024-07-08 DIAGNOSIS — T78.40XA ALLERGIC REACTION, INITIAL ENCOUNTER: ICD-10-CM

## 2024-07-08 PROCEDURE — 99284 EMERGENCY DEPT VISIT MOD MDM: CPT

## 2024-07-09 PROCEDURE — 6370000000 HC RX 637 (ALT 250 FOR IP): Performed by: NURSE PRACTITIONER

## 2024-07-09 PROCEDURE — 6360000002 HC RX W HCPCS: Performed by: NURSE PRACTITIONER

## 2024-07-09 PROCEDURE — 96372 THER/PROPH/DIAG INJ SC/IM: CPT

## 2024-07-09 RX ORDER — DIPHENHYDRAMINE HYDROCHLORIDE 50 MG/ML
50 INJECTION INTRAMUSCULAR; INTRAVENOUS ONCE
Status: COMPLETED | OUTPATIENT
Start: 2024-07-09 | End: 2024-07-09

## 2024-07-09 RX ORDER — DIPHENHYDRAMINE HYDROCHLORIDE 50 MG/ML
50 INJECTION INTRAMUSCULAR; INTRAVENOUS ONCE
Status: DISCONTINUED | OUTPATIENT
Start: 2024-07-09 | End: 2024-07-09

## 2024-07-09 RX ORDER — PREDNISONE 20 MG/1
40 TABLET ORAL ONCE
Status: COMPLETED | OUTPATIENT
Start: 2024-07-09 | End: 2024-07-09

## 2024-07-09 RX ORDER — PREDNISONE 50 MG/1
50 TABLET ORAL DAILY
Qty: 5 TABLET | Refills: 0 | Status: SHIPPED | OUTPATIENT
Start: 2024-07-09 | End: 2024-07-14

## 2024-07-09 RX ORDER — CLINDAMYCIN HYDROCHLORIDE 300 MG/1
300 CAPSULE ORAL 4 TIMES DAILY
Qty: 40 CAPSULE | Refills: 0 | Status: SHIPPED | OUTPATIENT
Start: 2024-07-09 | End: 2024-07-19

## 2024-07-09 RX ADMIN — PREDNISONE 40 MG: 20 TABLET ORAL at 00:24

## 2024-07-09 RX ADMIN — DIPHENHYDRAMINE HYDROCHLORIDE 50 MG: 50 INJECTION, SOLUTION INTRAMUSCULAR; INTRAVENOUS at 00:28

## 2024-07-09 NOTE — ED NOTES
Patient declined to wait 10 minutes after receiving a shot. Discharge information reviewed and questions answered.

## 2024-07-09 NOTE — DISCHARGE INSTRUCTIONS
Stop Bactrim.  Start clindamycin.  Take steroid and use Benadryl and Zyrtec as needed for itching.  Follow-up with your PCP and return for new or worsening symptom

## 2024-07-09 NOTE — ED TRIAGE NOTES
Patient presents to ED with chief complaint of rash. Patient states that she was recently put on antibiotic for a cyst (bactrim) and then broke out in this rash. Patient resting in bed. Respirations easy and unlabored. No distress noted. Call light within reach.

## 2024-07-10 NOTE — ED PROVIDER NOTES
review and the patient.     Pertinent previous records reviewed:  previous notes, admissions and hospitalizations .    Code Status: Not addressed at time of initial evaluation             See Formal Diagnostic Results above for the lab and radiology tests and orders.         3)  Treatment and Disposition         ED Reassessment/Response to interventions: Stable     NA         Case discussed with consulting clinician/attending physician:  None/None         Shared Decision-Making was performed and disposition discussed with the       Patient/Family and questions answered          Social determinants of health impacting treatment or disposition:     4) MIPS  N/A                    Medical Decision Making  Patient seen evaluated the emergency room for an allergic reaction.  No labs or imaging is necessary.  Patient has hives all over her trunk and her upper arms.  Patient had just started Bactrim.  Will change the Bactrim to clindamycin.  Encourage patient to follow-up closely with her provider.  Return if symptoms worsen.  Patient is agreeable.  She is given strict return and follow-up precautions      Vitals Reviewed:    Vitals:    07/08/24 2303 07/08/24 2305   BP: (!) 157/90    Pulse: (!) 134 (!) 117   Resp: 18    Temp: 99 °F (37.2 °C)    SpO2: 98%        The patient was seen and examined. Appropriate diagnostic testing was performed and results reviewed with the patient.         The results of pertinent diagnostic studies and exam findings were discussed. The patient’s provisional diagnosis and plan of care were discussed with the patient and present family who expressed understanding and agreement with the POC. Any medications were reviewed and indications and risks of medications were discussed with the patient /family present. Strict verbal and written return precautions, instructions and appropriate follow-up provided to  the patient.   Patient was DISCHARGED from the hospital. Based on the reassuring ED workup

## 2024-09-03 ENCOUNTER — TELEPHONE (OUTPATIENT)
Dept: SURGERY | Age: 24
End: 2024-09-03

## 2024-09-03 NOTE — TELEPHONE ENCOUNTER
Pt called and states she had a pilonidal cyst that busted open.  She is not having any pain.  It is seeping, but not as bad as initially.  Do you want to see the pt or send in antibiotic?

## 2024-09-04 RX ORDER — DOXYCYCLINE HYCLATE 100 MG
100 TABLET ORAL 2 TIMES DAILY
Qty: 14 TABLET | Refills: 0 | Status: SHIPPED | OUTPATIENT
Start: 2024-09-04 | End: 2024-09-11

## 2024-09-04 NOTE — TELEPHONE ENCOUNTER
Left msg for pt.  She can be seen by Dr. Ellsworth on 9/11/24 at 1:15 pm if ok.  Does pt have intolerance to Bactrim?

## 2024-10-17 ENCOUNTER — HOSPITAL ENCOUNTER (EMERGENCY)
Age: 24
Discharge: HOME | End: 2024-10-17
Payer: COMMERCIAL

## 2024-10-17 VITALS
OXYGEN SATURATION: 98 % | BODY MASS INDEX: 30.92 KG/M2 | DIASTOLIC BLOOD PRESSURE: 84 MMHG | HEIGHT: 65 IN | RESPIRATION RATE: 16 BRPM | HEART RATE: 89 BPM | SYSTOLIC BLOOD PRESSURE: 125 MMHG | TEMPERATURE: 98.5 F | WEIGHT: 185.6 LBS

## 2024-10-17 DIAGNOSIS — B34.9 VIRAL ILLNESS: Primary | ICD-10-CM

## 2024-10-17 LAB
FLUAV AG SPEC QL: NEGATIVE
FLUBV AG SPEC QL: NEGATIVE
S PYO AG THROAT QL: NEGATIVE

## 2024-10-17 PROCEDURE — 99213 OFFICE O/P EST LOW 20 MIN: CPT

## 2024-10-17 PROCEDURE — 87804 INFLUENZA ASSAY W/OPTIC: CPT

## 2024-10-17 PROCEDURE — 87651 STREP A DNA AMP PROBE: CPT

## 2024-10-17 ASSESSMENT — PAIN DESCRIPTION - LOCATION: LOCATION: GENERALIZED

## 2024-10-17 ASSESSMENT — PAIN - FUNCTIONAL ASSESSMENT
PAIN_FUNCTIONAL_ASSESSMENT: ACTIVITIES ARE NOT PREVENTED
PAIN_FUNCTIONAL_ASSESSMENT: 0-10

## 2024-10-17 ASSESSMENT — ENCOUNTER SYMPTOMS
RHINORRHEA: 1
NAUSEA: 0
DIARRHEA: 0
VOMITING: 0
SHORTNESS OF BREATH: 0
WHEEZING: 0
EYE REDNESS: 0
EYE DISCHARGE: 0
ABDOMINAL PAIN: 0
SORE THROAT: 0
COUGH: 1
BACK PAIN: 0
EYE PAIN: 0
CONSTIPATION: 0
ALLERGIC/IMMUNOLOGIC NEGATIVE: 1
TROUBLE SWALLOWING: 0

## 2024-10-17 ASSESSMENT — PAIN DESCRIPTION - ONSET: ONSET: ON-GOING

## 2024-10-17 ASSESSMENT — PAIN DESCRIPTION - PAIN TYPE: TYPE: ACUTE PAIN

## 2024-10-17 ASSESSMENT — PAIN DESCRIPTION - FREQUENCY: FREQUENCY: CONTINUOUS

## 2024-10-17 ASSESSMENT — PAIN SCALES - GENERAL: PAINLEVEL_OUTOF10: 4

## 2024-10-17 ASSESSMENT — PAIN DESCRIPTION - DESCRIPTORS: DESCRIPTORS: ACHING

## 2024-10-17 NOTE — ED PROVIDER NOTES
[facility] URGENT CARE  Urgent Care Encounter      CHIEF COMPLAINT       Chief Complaint   Patient presents with    Headache    Nasal Congestion    Fatigue       Nurses Notes reviewed and I agree except as noted in the HPI.  HISTORY OF PRESENT ILLNESS   Lola Young is a 24 y.o. female who presents with onset yesterday of headache, nasal congestion, fatigue, chest pain with coughing, stomach upset, occasional cough.  She does have diarrhea but also Crohn's so she is not certain about that.  Denies known fever, chills or night sweats.  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  Does not smoke or vape of asthma.    REVIEW OF SYSTEMS     Review of Systems   Constitutional:  Positive for activity change, appetite change and fatigue. Negative for fever.   HENT:  Positive for congestion and rhinorrhea. Negative for ear pain, sore throat and trouble swallowing.    Eyes:  Negative for pain, discharge and redness.   Respiratory:  Positive for cough. Negative for shortness of breath and wheezing.    Cardiovascular:  Positive for chest pain.   Gastrointestinal:  Negative for abdominal pain, constipation, diarrhea, nausea and vomiting.   Endocrine: Negative.    Genitourinary:  Negative for dysuria, frequency and urgency.   Musculoskeletal:  Negative for arthralgias, back pain and myalgias.   Skin:  Negative for rash.   Allergic/Immunologic: Negative.    Neurological:  Positive for headaches. Negative for dizziness, tremors and weakness.   Hematological: Negative.    Psychiatric/Behavioral:  Negative for dysphoric mood and sleep disturbance. The patient is not nervous/anxious.        PAST MEDICAL HISTORY         Diagnosis Date    Crohn's colitis (HCC) 2016    Environmental allergies     Pilonidal cyst        SURGICAL HISTORY     Patient  has a past surgical history that includes other surgical history (12/05/2017); Colonoscopy; Pilonidal cyst excision (N/A, 12/5/2019); Colonoscopy (N/A, 11/22/2022);  LUANN Ceballos - CNP  10/17/24 5067

## 2024-10-17 NOTE — ED TRIAGE NOTES
Pt to urgent care due to a headache, fatigue and nasal congestion. New onset of symptoms started yesterday.

## 2025-02-12 ENCOUNTER — HOSPITAL ENCOUNTER (EMERGENCY)
Age: 25
Discharge: HOME | End: 2025-02-12
Payer: COMMERCIAL

## 2025-02-12 VITALS
TEMPERATURE: 97.5 F | SYSTOLIC BLOOD PRESSURE: 142 MMHG | DIASTOLIC BLOOD PRESSURE: 99 MMHG | OXYGEN SATURATION: 97 % | HEART RATE: 99 BPM | RESPIRATION RATE: 16 BRPM

## 2025-02-12 DIAGNOSIS — R11.0 NAUSEA: ICD-10-CM

## 2025-02-12 DIAGNOSIS — J06.9 UPPER RESPIRATORY TRACT INFECTION, UNSPECIFIED TYPE: Primary | ICD-10-CM

## 2025-02-12 DIAGNOSIS — R19.7 DIARRHEA, UNSPECIFIED TYPE: ICD-10-CM

## 2025-02-12 DIAGNOSIS — J01.40 ACUTE NON-RECURRENT PANSINUSITIS: ICD-10-CM

## 2025-02-12 PROCEDURE — 99213 OFFICE O/P EST LOW 20 MIN: CPT

## 2025-02-12 RX ORDER — FLUTICASONE PROPIONATE 50 MCG
2 SPRAY, SUSPENSION (ML) NASAL DAILY
Qty: 16 G | Refills: 0 | Status: SHIPPED | OUTPATIENT
Start: 2025-02-12

## 2025-02-12 RX ORDER — PREDNISONE 10 MG/1
TABLET ORAL
Qty: 20 TABLET | Refills: 0 | Status: SHIPPED | OUTPATIENT
Start: 2025-02-12 | End: 2025-02-22

## 2025-02-12 NOTE — ED PROVIDER NOTES
[facility] URGENT CARE      URGENT CARE     Pt Name: Lola Young  MRN: [id number]  Birthdate 2000  Date of evaluation: 2/12/2025  Provider: LUANN Price CNP    Urgent Care Encounter     CHIEF COMPLAINT       Chief Complaint   Patient presents with    Congestion    Nausea    Diarrhea     HISTORY OF PRESENT ILLNESS   Lola Young is a 24 y.o. female who presents to urgent care chief complaint of sinus pressure/nausea.  Describes her were symptom is facial pain.  Symptoms started 3 days ago.  Denies smoking or tobacco use.  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  Concomitant, body aches including bilateral back and shoulder pain, went to chiropractor without any improvement.  Also taking Tylenol Cold and flu without improvement.  Denies chest pains but admits that burning in chest associated with cough.    Patient on Entyvio for Crohn's.    History obtained from patient    PAST MEDICAL HISTORY         Diagnosis Date    Crohn's colitis (HCC) 2016    Environmental allergies     Pilonidal cyst      SURGICALHISTORY     Patient  has a past surgical history that includes other surgical history (12/05/2017); Colonoscopy; Pilonidal cyst excision (N/A, 12/5/2019); Colonoscopy (N/A, 11/22/2022); and hemicolectomy (N/A, 2/7/2023).  CURRENT MEDICATIONS       Previous Medications    HYOSCYAMINE (LEVSIN/SL) 125 MCG SUBLINGUAL TABLET    take 1 tablet by mouth twice a day if needed for abdominal pain    LORATADINE-PSEUDOEPHEDRINE (CLARITIN-D 24HR)  MG PER EXTENDED RELEASE TABLET    Take 1 tablet by mouth daily    MICONAZOLE (MICOTIN) 2 % POWDER    Apply topically 2 times daily.    SERTRALINE (ZOLOFT) 50 MG TABLET    Take 1 tablet by mouth daily    VEDOLIZUMAB (ENTYVIO IV)    Infuse intravenously Every 3 months     ALLERGIES     Patient is is allergic to bactrim [sulfamethoxazole-trimethoprim] and morphine.  Patients   Immunization History    Administered Date(s) Administered    DTaP vaccine 2000, 2000, 01/18/2001, 08/25/2006    HPV Quadrivalent (Gardasil) 05/14/2012, 02/08/2013, 06/11/2013    Hep A, HAVRIX, VAQTA, (age 12m-18y), IM, 0.5mL 08/20/2018    Hep B, ENGERIX-B, RECOMBIVAX-HB, (age Birth - 19y), IM, 0.5mL 2000, 2000, 08/25/2006    Hib PRP-OMP, PEDVAXHIB, (age 2m-6y, Adlt Risk), IM, 0.5mL 01/18/2001    Hib vaccine 2000    MMR, PRIORIX, M-M-R II, (age 12m+), SC, 0.5mL 08/25/2006, 10/12/2006    Meningococcal ACWY, MENACTRA (MenACWY-D), (age 9m-55y), IM, 0.5mL 08/20/2018    Meningococcal MPSV4 (Menomune) 05/14/2012    Poliovirus, IPOL, (age 6w+), SC/IM, 0.5mL 2000, 01/18/2001, 08/25/2006    TDaP, ADACEL (age 10y-64y), BOOSTRIX (age 10y+), IM, 0.5mL 05/14/2012, 03/03/2021    Varicella, VARIVAX, (age 12m+), SC, 0.5mL 08/25/2006, 05/14/2012     FAMILY HISTORY     Patient's family history includes Arthritis in her maternal grandfather; COPD in her maternal grandmother; Heart Disease in her maternal grandfather and paternal grandfather; High Blood Pressure in her mother; No Known Problems in her father; Stroke in her maternal grandfather; Substance Abuse in her maternal grandfather; Vision Loss in her maternal grandfather.  SOCIAL HISTORY     Patient  reports that she has never smoked. She has never used smokeless tobacco. She reports current alcohol use. She reports that she does not use drugs.  PHYSICAL EXAM     ED TRIAGE VITALS  BP: (!) 142/99, Temp: 97.5 °F (36.4 °C), Pulse: 99, Respirations: 16, SpO2: 97 %,Estimated body mass index is 30.89 kg/m² as calculated from the following:    Height as of 10/17/24: 1.651 m (5' 5\").    Weight as of 10/17/24: 84.2 kg (185 lb 9.6 oz).,Patient's last menstrual period was 01/29/2025.  Physical Exam  Vitals and nursing note reviewed.     Constitutional:       General: No acute distress.     Appearance: Normal appearance. Not ill-appearing, toxic-appearing or diaphoretic.

## 2025-02-12 NOTE — DISCHARGE INSTRUCTIONS
Your assessment and current illness are greatly indicative of a sinus infection.  Significant facial pain/duration and/or immunocompromise status indicate need for antibiotics.  Please take antibiotics as prescribed other gone even if you are feeling better.      Please take prednisone in addition, this will help decrease inflammation and pain and help facilitate drainage of the sinuses to help expedite resolution of symptoms.    Please perform sinus rinses outlined in your discharge paperwork.    Please use Flonase in morning/evening for the next week.  Hydrate well keeping urine clear/pale yellow, this will help decrease side effects of antibiotics.    If you have symptoms including but not limited to chest pain/shortness of breath, uncontrolled fevers, uncontrolled nausea/vomiting please go to ER.    Your symptoms fail to improve okay to see family doctor or return to urgent care.    I hope you are feeling better soon!.

## 2025-02-12 NOTE — ED NOTES
To [facility] with complaints of sinus pressure, nausea, diarrhea, hurts to breath. Started about 3 days ago     Magaly Phipps, RN  02/12/25 8252

## 2025-04-11 ENCOUNTER — HOSPITAL ENCOUNTER (EMERGENCY)
Age: 25
Discharge: HOME | End: 2025-04-11
Payer: COMMERCIAL

## 2025-04-11 VITALS
SYSTOLIC BLOOD PRESSURE: 131 MMHG | DIASTOLIC BLOOD PRESSURE: 87 MMHG | RESPIRATION RATE: 16 BRPM | OXYGEN SATURATION: 97 % | TEMPERATURE: 98.1 F | HEART RATE: 108 BPM | BODY MASS INDEX: 31.09 KG/M2 | WEIGHT: 186.8 LBS

## 2025-04-11 DIAGNOSIS — K08.89 PAIN, DENTAL: Primary | ICD-10-CM

## 2025-04-11 PROCEDURE — 99213 OFFICE O/P EST LOW 20 MIN: CPT

## 2025-04-11 PROCEDURE — 99213 OFFICE O/P EST LOW 20 MIN: CPT | Performed by: NURSE PRACTITIONER

## 2025-04-11 RX ORDER — IBUPROFEN 600 MG/1
600 TABLET, FILM COATED ORAL EVERY 6 HOURS PRN
Qty: 20 TABLET | Refills: 0 | Status: SHIPPED | OUTPATIENT
Start: 2025-04-11 | End: 2025-04-16

## 2025-04-11 ASSESSMENT — PAIN - FUNCTIONAL ASSESSMENT
PAIN_FUNCTIONAL_ASSESSMENT: ACTIVITIES ARE NOT PREVENTED
PAIN_FUNCTIONAL_ASSESSMENT: 0-10

## 2025-04-11 ASSESSMENT — PAIN DESCRIPTION - FREQUENCY: FREQUENCY: CONTINUOUS

## 2025-04-11 ASSESSMENT — PAIN DESCRIPTION - LOCATION: LOCATION: TEETH

## 2025-04-11 ASSESSMENT — PAIN SCALES - GENERAL: PAINLEVEL_OUTOF10: 8

## 2025-04-11 ASSESSMENT — PAIN DESCRIPTION - ORIENTATION: ORIENTATION: LOWER;RIGHT;LEFT

## 2025-04-11 NOTE — ED PROVIDER NOTES
[facility] URGENT CARE  UrgentCare Encounter      CHIEFCOMPLAINT       Chief Complaint   Patient presents with    Dental Pain       Nurses Notes reviewed and I agree except as noted in the HPI.  HISTORY OF PRESENT ILLNESS     Lola Young is a 24 y.o. female who presents to the urgent care for evaluation of bilateral lower wisdom tooth pain that started today.    The patient/patient representative has no other acute complaints at this time.    REVIEW OF SYSTEMS     Review of Systems   Constitutional:  Negative for chills, fatigue and fever.   HENT:  Positive for dental problem. Negative for trouble swallowing and voice change.    Respiratory:  Negative for cough.    Cardiovascular:  Negative for chest pain.   Skin:  Negative for rash.       PAST MEDICAL HISTORY         Diagnosis Date    Crohn's colitis (HCC) 2016    Environmental allergies     Pilonidal cyst        SURGICAL HISTORY     Patient  has a past surgical history that includes other surgical history (12/05/2017); Colonoscopy; Pilonidal cyst excision (N/A, 12/5/2019); Colonoscopy (N/A, 11/22/2022); and hemicolectomy (N/A, 2/7/2023).    CURRENT MEDICATIONS       Discharge Medication List as of 4/11/2025  6:43 PM        CONTINUE these medications which have NOT CHANGED    Details   fluticasone (FLONASE) 50 MCG/ACT nasal spray 2 sprays by Each Nostril route daily, Disp-16 g, R-0Normal      hyoscyamine (LEVSIN/SL) 125 MCG sublingual tablet take 1 tablet by mouth twice a day if needed for abdominal painHistorical Med      Vedolizumab (ENTYVIO IV) Infuse intravenously Every 3 monthsHistorical Med      loratadine-pseudoephedrine (CLARITIN-D 24HR)  MG per extended release tablet Take 1 tablet by mouth daily, Disp-30 tablet, R-0Normal             ALLERGIES     Patient is is allergic to bactrim [sulfamethoxazole-trimethoprim] and morphine.    FAMILY HISTORY     Patient'sfamily history includes Arthritis in her maternal grandfather; COPD in her maternal  change or worsen please go to the nearest emergency room      LUANN Porter - CNP    Please note that some or all of this chart was generated using [employer] voice recognition software. Although every effort was made to ensure the accuracy of this automated transcription, some errors in transcription may have occurred.         Jennifer Wood, LUANN - CNP  04/12/25 0834

## 2025-04-11 NOTE — ED NOTES
PT GIVEN DISCHARGE INSTRUCTIONS, VERBALIZES UNDERSTANDING.  PT ASSESSMENT UNCHANGED, DISCHARGED IN STABLE CONDITION.        Elias Campuzano, RN  04/11/25 3114

## 2025-04-11 NOTE — ED TRIAGE NOTES
Lola arrives to room with complaint of  bilateral lower wisdom tooth pain with pain radiating up into head and down into neck.  symptoms started today.    Taking tylenol, last dose at 9 am today    Work note

## 2025-04-12 ASSESSMENT — ENCOUNTER SYMPTOMS
VOICE CHANGE: 0
COUGH: 0
TROUBLE SWALLOWING: 0

## 2025-06-09 ENCOUNTER — APPOINTMENT (OUTPATIENT)
Dept: GENERAL RADIOLOGY | Age: 25
End: 2025-06-09
Payer: COMMERCIAL

## 2025-06-09 ENCOUNTER — HOSPITAL ENCOUNTER (EMERGENCY)
Age: 25
Discharge: HOME | End: 2025-06-09
Payer: COMMERCIAL

## 2025-06-09 VITALS
RESPIRATION RATE: 16 BRPM | SYSTOLIC BLOOD PRESSURE: 117 MMHG | TEMPERATURE: 98.3 F | DIASTOLIC BLOOD PRESSURE: 88 MMHG | HEART RATE: 72 BPM | OXYGEN SATURATION: 97 %

## 2025-06-09 DIAGNOSIS — S93.402A SPRAIN OF LEFT ANKLE, UNSPECIFIED LIGAMENT, INITIAL ENCOUNTER: Primary | ICD-10-CM

## 2025-06-09 PROCEDURE — 73630 X-RAY EXAM OF FOOT: CPT

## 2025-06-09 PROCEDURE — 99213 OFFICE O/P EST LOW 20 MIN: CPT | Performed by: NURSE PRACTITIONER

## 2025-06-09 PROCEDURE — 99213 OFFICE O/P EST LOW 20 MIN: CPT

## 2025-06-09 PROCEDURE — 73610 X-RAY EXAM OF ANKLE: CPT

## 2025-06-09 RX ORDER — SENNOSIDES 8.6 MG
650 CAPSULE ORAL EVERY 8 HOURS PRN
Qty: 60 TABLET | Refills: 0 | Status: SHIPPED | OUTPATIENT
Start: 2025-06-09

## 2025-06-09 ASSESSMENT — PAIN DESCRIPTION - ORIENTATION: ORIENTATION: LEFT

## 2025-06-09 ASSESSMENT — PAIN DESCRIPTION - ONSET: ONSET: SUDDEN

## 2025-06-09 ASSESSMENT — PAIN - FUNCTIONAL ASSESSMENT
PAIN_FUNCTIONAL_ASSESSMENT: ACTIVITIES ARE NOT PREVENTED
PAIN_FUNCTIONAL_ASSESSMENT: 0-10

## 2025-06-09 ASSESSMENT — PAIN DESCRIPTION - DESCRIPTORS: DESCRIPTORS: ACHING

## 2025-06-09 ASSESSMENT — ENCOUNTER SYMPTOMS: COUGH: 0

## 2025-06-09 ASSESSMENT — PAIN DESCRIPTION - FREQUENCY: FREQUENCY: CONTINUOUS

## 2025-06-09 ASSESSMENT — PAIN DESCRIPTION - PAIN TYPE: TYPE: ACUTE PAIN

## 2025-06-09 ASSESSMENT — PAIN DESCRIPTION - LOCATION: LOCATION: ANKLE

## 2025-06-09 ASSESSMENT — PAIN SCALES - GENERAL: PAINLEVEL_OUTOF10: 6

## 2025-06-09 NOTE — ED TRIAGE NOTES
To room with c/o left ankle pain that started 40 min pta. She was walking down stairs and they \"crumbled\" causing ankle to twist. Weightbearing with a limp.

## 2025-06-09 NOTE — ED PROVIDER NOTES
electronically signed by: Robert Cabello MD on    06/09/2025 07:07 PM        URGENT CARE COURSE:     Vitals:    06/09/25 1830   BP: 117/88   Pulse: 72   Resp: 16   Temp: 98.3 °F (36.8 °C)   SpO2: 97%       Medications - No data to display  PROCEDURES:  FINALIMPRESSION      1. Sprain of left ankle, unspecified ligament, initial encounter        DISPOSITION/PLAN   DISPOSITION Decision To Discharge 06/09/2025 07:14:14 PM   DISPOSITION CONDITION Stable           ED Course as of 06/09/25 1917   Mon Jun 09, 2025 1912 XR ANKLE LEFT (MIN 3 VIEWS)  No acute findings per radiologist read [HA]   1912 XR FOOT LEFT (MIN 3 VIEWS)  No acute findings per radiologist read [HA]      ED Course User Index  [LIMA] Jennifer Wood, APRN - CNP       Problem List Items Addressed This Visit    None  Visit Diagnoses         Sprain of left ankle, unspecified ligament, initial encounter    -  Primary    Relevant Medications    acetaminophen (TYLENOL 8 HOUR) 650 MG extended release tablet    Other Relevant Orders    [employer] Ankle Brace, Left ()          Splint   Rice  Tylenol as needed for pain and swelling, patient states that she does not tolerate ibuprofen due to Crohn's.  Limited activity as discussed.     The results of pertinent diagnostic studies and exam findings were discussed with patient/patient representative.   Shared decision-making was performed and patient/patient representative are agreeable that they are suitable for discharge at this time.  The patient’s provisional diagnosis and plan of care were discussed with the patient/patient representative who expressed understanding.  The patient/representative is given strict written and verbal instructions about care at home, including over-the-counter management, prescription information, follow-up, and signs and symptoms of worsening of condition, and the patient/patient representative did verbalize understanding of these instructions.  Patient will go

## 2025-07-01 ENCOUNTER — HOSPITAL ENCOUNTER (EMERGENCY)
Age: 25
Discharge: HOME | End: 2025-07-01
Payer: COMMERCIAL

## 2025-07-01 VITALS
HEART RATE: 105 BPM | SYSTOLIC BLOOD PRESSURE: 115 MMHG | RESPIRATION RATE: 16 BRPM | BODY MASS INDEX: 30.95 KG/M2 | OXYGEN SATURATION: 98 % | DIASTOLIC BLOOD PRESSURE: 81 MMHG | TEMPERATURE: 97.9 F | WEIGHT: 186 LBS

## 2025-07-01 DIAGNOSIS — J06.9 ACUTE UPPER RESPIRATORY INFECTION: Primary | ICD-10-CM

## 2025-07-01 DIAGNOSIS — H65.01 NON-RECURRENT ACUTE SEROUS OTITIS MEDIA OF RIGHT EAR: ICD-10-CM

## 2025-07-01 LAB — S PYO AG THROAT QL: NEGATIVE

## 2025-07-01 PROCEDURE — 99213 OFFICE O/P EST LOW 20 MIN: CPT

## 2025-07-01 PROCEDURE — 87651 STREP A DNA AMP PROBE: CPT

## 2025-07-01 RX ORDER — SERTRALINE HYDROCHLORIDE 25 MG/1
TABLET, FILM COATED ORAL
COMMUNITY
Start: 2025-04-17

## 2025-07-01 RX ORDER — BROMPHENIRAMINE MALEATE, PSEUDOEPHEDRINE HYDROCHLORIDE, AND DEXTROMETHORPHAN HYDROBROMIDE 2; 30; 10 MG/5ML; MG/5ML; MG/5ML
5 SYRUP ORAL 4 TIMES DAILY PRN
Qty: 118 ML | Refills: 0 | Status: SHIPPED | OUTPATIENT
Start: 2025-07-01 | End: 2025-07-08

## 2025-07-01 RX ORDER — PREDNISONE 20 MG/1
20 TABLET ORAL DAILY
Qty: 5 TABLET | Refills: 0 | Status: SHIPPED | OUTPATIENT
Start: 2025-07-01 | End: 2025-07-06

## 2025-07-01 RX ORDER — CYCLOBENZAPRINE HCL 10 MG
TABLET ORAL
COMMUNITY
Start: 2025-04-17

## 2025-07-01 ASSESSMENT — PAIN DESCRIPTION - FREQUENCY: FREQUENCY: CONTINUOUS

## 2025-07-01 ASSESSMENT — ENCOUNTER SYMPTOMS: COUGH: 1

## 2025-07-01 ASSESSMENT — PAIN SCALES - GENERAL: PAINLEVEL_OUTOF10: 7

## 2025-07-01 ASSESSMENT — PAIN - FUNCTIONAL ASSESSMENT
PAIN_FUNCTIONAL_ASSESSMENT: ACTIVITIES ARE NOT PREVENTED
PAIN_FUNCTIONAL_ASSESSMENT: 0-10

## 2025-07-01 ASSESSMENT — PAIN DESCRIPTION - ORIENTATION: ORIENTATION: RIGHT;LEFT

## 2025-07-01 ASSESSMENT — PAIN DESCRIPTION - LOCATION: LOCATION: CHEST;THROAT;EAR

## 2025-07-01 NOTE — DISCHARGE INSTRUCTIONS
Rest.  Medications as directed.  Drink plenty of fluids.  Salt water gargles.  Tylenol or Motrin as needed for pain.  Follow-up with primary care provider for any new or worsening symptoms Go to the emergency department for any other symptoms or concerns deemed emergent.

## 2025-07-01 NOTE — ED PROVIDER NOTES
[facility] URGENT CARE  UrgentCare Encounter      CHIEFCOMPLAINT       Chief Complaint   Patient presents with    Ear Pain    Cough       Nurses Notes reviewed and I agree except as noted in the HPI.  HISTORY OF PRESENT ILLNESS   Lola Young is a 24 y.o. female who presents today for right ear pain and cough.  States that started 2 days ago.  Patient has tried Tylenol for the symptoms.  Patient would like a work slip.    REVIEW OF SYSTEMS     Review of Systems   Constitutional: Negative.    HENT:  Positive for ear pain.    Respiratory:  Positive for cough.    Genitourinary: Negative.    Musculoskeletal: Negative.    Skin: Negative.    Allergic/Immunologic: Positive for environmental allergies.   Neurological: Negative.    Psychiatric/Behavioral: Negative.         PAST MEDICAL HISTORY         Diagnosis Date    Crohn's colitis (HCC) 2016    Environmental allergies     Pilonidal cyst        SURGICAL HISTORY     Patient  has a past surgical history that includes other surgical history (12/05/2017); Colonoscopy; Pilonidal cyst excision (N/A, 12/5/2019); Colonoscopy (N/A, 11/22/2022); and hemicolectomy (N/A, 2/7/2023).    CURRENT MEDICATIONS       Discharge Medication List as of 7/1/2025 10:01 AM        CONTINUE these medications which have NOT CHANGED    Details   cyclobenzaprine (FLEXERIL) 10 MG tablet TAKE 1/2 (ONE-HALF) TABLET BY MOUTH EVERY 8 HOURS AND AT BEDTIME AS NEEDED MYALGIA/SPASMHistorical Med      sertraline (ZOLOFT) 25 MG tablet TAKE 1/2 TO 1 (ONE-HALF TO ONE) TABLET BY MOUTH ONCE DAILYHistorical Med      Levocetirizine Dihydrochloride (XYZAL PO) Take by mouthHistorical MedPaused since today. Resumes on Wed 7/9/2025.      acetaminophen (TYLENOL 8 HOUR) 650 MG extended release tablet Take 1 tablet by mouth every 8 hours as needed for Pain, Disp-60 tablet, R-0Normal      ibuprofen (ADVIL;MOTRIN) 600 MG tablet Take 1 tablet by mouth every 6 hours as needed for Pain or Fever, Disp-20 tablet, R-0Normal

## 2025-07-01 NOTE — ED TRIAGE NOTES
Lola arrives to room with complaint of bilateral ear pain, fullness, popping, short of breath with cough, chest feels like it is burning symptoms started 2 days ago.       Taking tylenol, last dose last night    Work note

## (undated) DEVICE — CANNULA SEAL

## (undated) DEVICE — SOLUTION IV 1000ML LAC RINGERS PH 6.5 INJ USP VIAFLX PLAS

## (undated) DEVICE — BREAST HERNIA PACK: Brand: MEDLINE INDUSTRIES, INC.

## (undated) DEVICE — BASIC SINGLE BASIN BTC-LF: Brand: MEDLINE INDUSTRIES, INC.

## (undated) DEVICE — WOUND RETRACTOR AND PROTECTOR: Brand: ALEXIS O WOUND PROTECTOR-RETRACTOR

## (undated) DEVICE — SPONGE LAP W18XL18IN WHT COT 4 PLY FLD STRUNG RADPQ DISP ST

## (undated) DEVICE — GLOVE ORANGE PI 8   MSG9080

## (undated) DEVICE — SOLUTION SURG PREP POV IOD 7.5% 4 OZ

## (undated) DEVICE — TTL1LYR 16FR10ML 100%SIL TMPST TR: Brand: MEDLINE

## (undated) DEVICE — 450 ML BOTTLE OF 0.05% CHLORHEXIDINE GLUCONATE IN 99.95% STERILE WATER FOR IRRIGATION, USP AND APPLICATOR.: Brand: IRRISEPT ANTIMICROBIAL WOUND LAVAGE

## (undated) DEVICE — COVER ARMBRD W13XL28.5IN IMPERV BLU FOR OP RM

## (undated) DEVICE — ELECTRO LUBE IS A SINGLE PATIENT USE DEVICE THAT IS INTENDED TO BE USED ON ELECTROSURGICAL ELECTRODES TO REDUCE STICKING.: Brand: KEY SURGICAL ELECTRO LUBE

## (undated) DEVICE — PUMP SUC IRR TBNG L10FT W/ HNDPC ASSEMB STRYKEFLOW 2

## (undated) DEVICE — RELOAD STPL L60MM H1-2.6MM MESENTERY THN TISS WHT 6 ROW

## (undated) DEVICE — PREP SOL PVP IODINE 4%  4 OZ/BTL

## (undated) DEVICE — SEALANT TISS 10 CC FIBRIN VISTASEAL

## (undated) DEVICE — GLOVE ORANGE PI 7   MSG9070

## (undated) DEVICE — BLADELESS OBTURATOR: Brand: WECK VISTA

## (undated) DEVICE — DRAIN,WOUND,15FR,3/16,FULL-FLUTED: Brand: MEDLINE

## (undated) DEVICE — BLADE CLIPPER GEN PURP NS

## (undated) DEVICE — ARM DRAPE

## (undated) DEVICE — COLUMN DRAPE

## (undated) DEVICE — GOWN,SIRUS,NON REINFRCD,LARGE,SET IN SL: Brand: MEDLINE

## (undated) DEVICE — YANKAUER,BULB TIP,W/O VENT,RIGID,STERILE: Brand: MEDLINE

## (undated) DEVICE — BANDAGE ADH W1XL3IN NAT FAB WVN FLX DURABLE N ADH PD SEAL

## (undated) DEVICE — SUTURE 1 STRATAFIX SYMMETRIC PDS + 30CM CT-1 SXPP1A435

## (undated) DEVICE — EVACUATOR SURG 100CC SIL BLB SUCT RESVR FOR CLS WND DRNGE

## (undated) DEVICE — TUBING, SUCTION, 1/4" X 20', STRAIGHT: Brand: MEDLINE INDUSTRIES, INC.

## (undated) DEVICE — GENERAL LAPAROSCOPY-LF: Brand: MEDLINE INDUSTRIES, INC.

## (undated) DEVICE — SUTURE VCRL + SZ 3-0 L27IN ABSRB UD L26MM SH 1/2 CIR VCP416H

## (undated) DEVICE — TROCAR: Brand: KII FIOS FIRST ENTRY

## (undated) DEVICE — SOLUTION IV 1000ML 0.9% SOD CHL PH 5 INJ USP VIAFLX PLAS

## (undated) DEVICE — VESSEL SEALER EXTEND: Brand: ENDOWRIST

## (undated) DEVICE — SUTURE VCRL + SZ 2-0 L27IN ABSRB WHT SH 1/2 CIR TAPERCUT VCP417H

## (undated) DEVICE — 3M™ STERI-STRIP™ COMPOUND BENZOIN TINCTURE 40 BAGS/CARTON 4 CARTONS/CASE C1544: Brand: 3M™ STERI-STRIP™

## (undated) DEVICE — SUTURE V-LOC 180 SZ 3-0 L9IN ABSRB GRN L26MM V-20 1/2 CIR VLOCL0644

## (undated) DEVICE — APPLICATOR LAP 35 CM 2 RIGID VISTASEAL

## (undated) DEVICE — GLOVE ORTHO 8   MSG9480

## (undated) DEVICE — INSUFFLATION NEEDLE TO ESTABLISH PNEUMOPERITONEUM.: Brand: INSUFFLATION NEEDLE

## (undated) DEVICE — HEAD POSITIONER, SURGICAL: Brand: DEROYAL

## (undated) DEVICE — PAD,ABDOMINAL,5"X9",ST,LF,25/BX: Brand: MEDLINE INDUSTRIES, INC.

## (undated) DEVICE — TROCAR: Brand: KII SHIELDED BLADED ACCESS SYSTEM

## (undated) DEVICE — Z DISCONTINUED BY MEDLINE USE 2711682 TRAY SKIN PREP DRY W/ PREM GLV

## (undated) DEVICE — TUBING INSUFFLATOR HEAT HUMIDIFIED SMK EVAC SET PNEUMOCLEAR

## (undated) DEVICE — INTENDED FOR TISSUE SEPARATION, AND OTHER PROCEDURES THAT REQUIRE A SHARP SURGICAL BLADE TO PUNCTURE OR CUT.: Brand: BARD-PARKER ® CARBON RIB-BACK BLADES

## (undated) DEVICE — 35 ML SYRINGE LUER-LOCK TIP: Brand: MONOJECT

## (undated) DEVICE — STAPLER SKIN L440MM 32MM LNG 12 FIRING B FRM PWR + GRIPPING

## (undated) DEVICE — PENCIL SMK EVAC ALL IN 1 DSGN ENH VISIBILITY IMPROVED AIR

## (undated) DEVICE — TIP COVER ACCESSORY

## (undated) DEVICE — SUTURE VCRL + SZ 0 L18IN ABSRB TIE VCP106G

## (undated) DEVICE — RELOAD STPL L60MM H1.5-3.6MM REG TISS BLU GRIPPING SURF B